# Patient Record
(demographics unavailable — no encounter records)

---

## 2017-06-14 NOTE — PD
HPI


Chief Complaint:  STEMI Alert


Time Seen by Provider:  21:49


Travel History


International Travel<30 days:  No


Contact w/Intl Traveler<30days:  No


Traveled to known affect area:  No





History of Present Illness


HPI


The patient 70 years old and complains of retrosternal chest pain 7/10 which 

started shortly after he ate a roast beef dinner, about 90 minutes prior to the 

time of evaluation.  The pain is constant.  He denies radiation of the pain 

however noted that this morning he had some pain in the region of the right 

flank which seemed to radiate upward towards the right chest.  That pain went 

away as the patient worked today at the [employer].  He reports a family 

history of coronary artery disease with his father suffering an MI.  He has had 

no cough or fever or diaphoresis.  He does not smoke.  He reports intermittent 

pains involving the right flank/right lateral thorax/abdomen for a month or so.

  He thinks it could be related to his kidney.  He denies a personal history of 

coronary artery disease.  He notes a prolonged period of generalized inactivity 

for him at least weeks in duration.  At 21:48 and EKG was obtained revealing an 

inferior wall acute myocardial infarction.  Cardiologist was immediately 

notified and the patient the cath lab .  Heparin, aspirin and metoprolol 

ordered.  Nitro drip deferred secondary to elevations in leads II, III, and aVF.





PFSH


Past Medical History


Autoimmune Disease:  No


Blood Disorders:  No


Anxiety:  Yes


Depression:  Yes (X 10 YEARS)


Cancer:  No


Cardiovascular Problems:  Yes


High Cholesterol:  Yes


Chemotherapy:  No


Diabetes:  Yes


Patient Takes Glucophage:  No


Diminished Hearing:  No


Endocrine:  No


Gastrointestinal Disorders:  Yes (POSSIBLE HERNIAS)


Genitourinary:  Yes (SLOW STREAM FOR YEARS)


Hypertension:  Yes


Immune Disorder:  No


Kidney Stones:  Yes (X1 1996)


Musculoskeletal:  No


Neurologic:  No


Psychiatric:  Yes


Reproductive:  No


Radiation Therapy:  No


Renal Failure:  No


Sickle Cell Disease:  No





Past Surgical History


Abdominal Surgery:  Yes (LT INGUINAL REPAIR 1983)


AICD:  No


Arteriovenous Shunt:  No


Cardiac Surgery:  No


Ear Surgery:  No


Endocrine Surgery:  No


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


Genitourinary Surgery:  No


Gynecologic Surgery:  No


Insulin Pump:  No


Joint Replacement:  No


Oral Surgery:  No


Pacemaker:  No


Thoracic Surgery:  No


Other Surgery:  Yes (HERNIA REPAIR)





Social History


Alcohol Use:  Yes (SOCIAL)


Tobacco Use:  No


Substance Use:  No





Allergies-Medications


(Allergen,Severity, Reaction):  


Coded Allergies:  


     Terazosin (Verified  Allergy, Severe, Hallucinations, 6/14/17)


Reported Meds & Prescriptions





Reported Meds & Active Scripts


Active


Catapres (Clonidine HCl) 0.2 Mg Tab 0.2 Mg PO DAILY 


Reported


Zocor (Simvastatin) 40 Mg Tab 40 Mg PO HS 


Zoloft (Sertraline HCl) 100 Mg Tab 100 Mg PO HS 


Ativan (Lorazepam) 1 Mg Tab 0.5-1 Tab PO Q8HPRN 


Glucophage (Metformin HCl) 500 Mg Tab 500 Mg PO BID 


Hctz (Hydrochlorothiazide) 25 Mg Tab 25 Mg PO DAILY 


Vasotec (Enalapril Maleate) 10 Mg Tab 10 Mg PO BID 


Toprol Xl (Metoprolol Succinate) 100 Mg Tab 100 Mg PO DAILY 








Review of Systems


Except as stated in HPI:  all other systems reviewed are Neg


General / Constitutional:  No: Fever, Chills


Cardiovascular:  Positive: Chest Pain or Discomfort





Physical Exam


Narrative


GENERAL: 70-year-old male well-nourished well-developed moderate distress due 

to anxiety and/or pain


SKIN: Focused skin assessment warm/dry.


HEAD: Atraumatic. Normocephalic. 


EYES: Pupils equal and round. No scleral icterus. No injection or drainage. 


ENT: No nasal bleeding or discharge.  Mucous membranes pink and moist.


NECK: Trachea midline. No JVD. 


CARDIOVASCULAR: Heart rate is approximately 100 110.  It is regular.


RESPIRATORY: Lungs are clear.  Tachypnea present.


GASTROINTESTINAL: Abdomen soft, non-tender, nondistended. Hepatic and splenic 

margins not palpable. 


MUSCULOSKELETAL: No obvious deformities. No clubbing.  No cyanosis.  No edema. 


NEUROLOGICAL: Awake and alert. No obvious cranial nerve deficits.  Motor 

grossly within normal limits. Normal speech.


PSYCHIATRIC: Appropriate mood and affect; insight and judgment normal.





Data


Data


Last Documented VS





Vital Signs








  Date Time  Temp Pulse Resp B/P Pulse Ox O2 Delivery O2 Flow Rate FiO2


 


6/14/17 21:55     100 Nasal Cannula 2 


 


6/14/17 20:40 97.5 98 22 142/81    





Vital signs reviewed


Orders





 Troponin I (6/14/17 21:51)


Ckmb (Isoenzyme) Profile (6/14/17 21:51)


Complete Blood Count With Diff (6/14/17 21:51)


I-Stat Profile (6/14/17 21:51)


I-Stat Creatinine (6/14/17 21:51)


Calcium (6/14/17 21:51)


Magnesium (Mg) (6/14/17 21:51)


Prothrombin Time / Inr (Pt) (6/14/17 21:51)


Act Partial Throm Time (Ptt) (6/14/17 21:51)


B-Type Natriuretic Peptide (6/14/17 21:51)


Chest, Single Ap (6/14/17 21:51)


Electrocardiogram (6/14/17 21:51)


Oxygen Administration (6/14/17 21:51)


Iv Access Insert/Monitor (6/14/17 21:51)


Oximetry (6/14/17 21:51)


Sodium Chlor 0.9% 1000 Ml Inj (Ns 1000 M (6/14/17 21:51)


Sodium Chloride 0.9% Flush (Ns Flush) (6/14/17 22:00)


Aspirin Chew (Aspirin Chew) (6/14/17 21:51)


Heparin Inj (Heparin Inj) (6/14/17 21:51)


Metoprolol Tartrate Inj (Lopressor Inj) (6/14/17 22:00)


Admit Order (Ed Use Only) (6/14/17 21:53)





Labs








 Laboratory Tests








Test 6/14/17





 21:50


 


Bedside Hemoglobin 14.6 G/DL


 


Bedside Hematocrit 43.0 %


 


Prothrombin Time 10.2 SEC


 


Prothromb Time International 0.9 RATIO





Ratio 


 


Activated Partial 24.3 SEC





Thromboplast Time 


 


Bedside Sodium 141 MMOL/L


 


Bedside Potassium 3.5 MMOL/L


 


Bedside Chloride 105 MMOL/L


 


Bedside Blood Urea Nitrogen 27 MG/DL


 


Bedside Creatinine 1.7 MG/DL


 


Bedside Glucose 349 MG/DL


 


Calcium Level 9.4 MG/DL














[facility]


Medical Decision Making


Medical Screen Exam Complete:  Yes


Emergency Medical Condition:  Yes


Differential Diagnosis


STEMI, unstable angina, coronary vasospasm, PE, PTX, aortic dissection, 

pericarditis, myocarditis, endocarditis, PNA, esophageal disease, aneurysm, 

musculoskeletal etiologies, anxiety, cocaine/sympathomimetic abuse


Narrative Course


EKG reveals ST elevations in the inferior leads with reciprocal changes in I 

and aVL well as V2, V3 and V4.


The patient will go directly to the catheterization lab for coronary 

intervention.  Case discussed with Dr. Thomas.


 iSTATs


Creatinine 1.7


BUN 27


Hb 14.6


Glucose 349


I accompanied pt to cath lab where he remained stable.


Dr William arrived at 1030pm.


Critical Care Narrative


Aggregate critical care time was 50 minutes. Time to perform other separately 

billable procedures was not  


included in the critical care time. My time did not include minutes spent 

treating any other patients simultaneously or on  


activities that did not directly contribute to the patient's treatment.  





The services I provided to this patient were to treat and/or prevent clinically 

significant deterioration that could result  


in: Cardiopulmonary arrest, permanent disability, mortality





I provided critical care services requiring my management, as noted below:


Chart data review, documentation time, medication orders and management, vital 

sign assessments/reviewing monitor data,  


ordering and reviewing lab tests, ordering and interpreting/reviewing x-rays 

and diagnostic studies, care of the patient  


and discussion of the patient with the admitting physicians.





Diagnosis





 Primary Impression:  


 STEMI (ST elevation myocardial infarction)


 Qualified Code:  I21.19 - ST elevation myocardial infarction (STEMI) involving 

other coronary artery of inferior wall





Admitting Information


Admitting Physician Requests:  Jeffry Anand MD Jun 14, 2017 22:03

## 2017-06-14 NOTE — RADRPT
EXAM DATE/TIME:  06/14/2017 22:01 

 

[facility] COMPARISON:     

No previous studies available for comparison.

 

                     

INDICATIONS :     

Chest pain, STEMI alert.

                     

 

MEDICAL HISTORY :     

None.          

 

SURGICAL HISTORY :     

None.   

 

ENCOUNTER:     

Initial                                        

 

ACUITY:     

1 day      

 

PAIN SCORE:     

10/10

 

LOCATION:     

Bilateral chest 

 

FINDINGS:     

Trace bibasilar atelectasis. No infiltrate. No pleural effusion or pneumothorax.

 

Heart size upper limits of normal. Thoracic aorta is atherosclerotic and tortuous.

 

CONCLUSION:     

Minimal bibasilar atelectasis. Borderline cardiomegaly. Tortuous and atherosclerotic aorta.

 

 

 

 Michelet Larios MD on June 14, 2017 at 22:05           

Board Certified Radiologist.

 This report was verified electronically.

## 2017-06-15 NOTE — RADRPT
EXAM DATE/TIME:  06/15/2017 01:13 

 

[facility] COMPARISON:     

CHEST SINGLE AP, June 14, 2017, 22:01.

 

                     

INDICATIONS :     

Difficulty breathing, chest congestion

                     

 

MEDICAL HISTORY :     

None.          

 

SURGICAL HISTORY :     

None.   

 

ENCOUNTER:     

Initial                                        

 

ACUITY:     

1 day      

 

PAIN SCORE:     

Non-responsive.

 

LOCATION:     

Bilateral chest 

 

FINDINGS:     

There is moderate perivascular pulmonary edema. Focal consolidation is not seen.

 

CONCLUSION:     Moderate CHF not present previously.

 

 

 PJ Randolph MD on Lisbeth 15, 2017 at 1:24           

Board Certified Radiologist.

 This report was verified electronically.

## 2017-06-15 NOTE — PD.CONS
[facility]


Service


Critical Care Medicine


Consult Requested By





Primary Care Physician


Katlin Morrow


History of Present Illness


70 years old and complains of retrosternal chest pain 7/10 which started 

shortly after he ate a roast beef dinner, about 90 minutes prior to the time of 

evaluation.  The pain was constant.  He denied radiation of the pain however 

noted that this morning he had some pain in the region of the right flank which 

seemed to radiate upward towards the right chest.  That pain went away as the 

patient worked today at the [employer].  He reports a family history of 

coronary artery disease with his father suffering an MI.  He has no prior 

history of coronary artery disease.  At 21:48 and EKG was obtained revealing an 

inferior wall acute myocardial infarction.  Cardiologist was notified and the 

patient went immediately to cardiac catheterization for successful percutaneous 

intervention.





Review of Systems


Constitutional:  COMPLAINS OF: Diaphoretic episodes,  DENIES: Fatigue, Fever, 

Weight gain, Weight loss, Chills, Dizziness, Change in appetite, Night Sweats


Endocrine:  DENIES: Heat/cold intolerance, Polydipsia, Polyuria, Polyphagia


Eyes:  DENIES: Blurred vision, Diplopia, Eye inflammation, Eye pain, Vision loss

, Photosensitivity, Double Vision


Ears, nose, mouth, throat:  DENIES: Tinnitus, Hearing loss, Vertigo, Nasal 

discharge, Oral lesions, Throat pain, Hoarseness, Ear Pain, Running Nose, 

Epistaxis, Sinus Pain, Toothache, Odynophagia


Respiratory:  COMPLAINS OF: Shortness of breath,  DENIES: Apneas, Cough, Snoring

, Wheezing, Hemoptysis, Sputum production


Cardiovascular:  COMPLAINS OF: Chest pain, Dyspnea on Exertion,  DENIES: 

Palpitations, Syncope, PND, Lower Extremity Edema, Orthopnea, Claudication


Gastrointestinal:  DENIES: Abdominal pain, Black stools, Bloody stools, 

Constipation, Diarrhea, Nausea, Vomiting, Difficulty Swallowing, Anorexia


Genitourinary:  DENIES: Sexual dysfunction, Urinary frequency, Urinary 

incontinence, Urgency, Hematuria, Dysuria, Nocturia, Penile Discharge, 

Testicular Pain, Testicular Swelling


Musculoskeletal:  DENIES: Joint pain, Muscle aches, Stiffness, Joint Swelling, 

Back pain, Neck pain


Integumentary:  DENIES: Abnormal pigmentation, Nail changes, Pruritus, Rash


Hematologic/lymphatic:  DENIES: Bruising, Lymphadenopathy


Immunologic/allergic:  DENIES: Eczema, Urticaria


Neurologic:  DENIES: Abnormal gait, Headache, Localized weakness, Paresthesias, 

Seizures, Speech Problems, Tremor, Poor Balance


Psychiatric:  DENIES: Anxiety, Confusion, Mood changes, Depression, 

Hallucinations, Agitation, Suicidal Ideation, Homicidal Ideation, Delusions





Past Family Social History


Allergies:  


Coded Allergies:  


     Terazosin (Verified  Allergy, Severe, Hallucinations, 6/14/17)


Past Medical History


Hypertension


Diabetes


BPH


Dyslipidemia


Depression


Anxiety


Past Surgical History


Left inguinal hernia repair in 1983


Right eye glaucoma surgery 2012


Reported Medications





Reported Meds & Active Scripts


Active


Catapres (Clonidine HCl) 0.2 Mg Tab 0.2 Mg PO DAILY 


Reported


Zocor (Simvastatin) 40 Mg Tab 40 Mg PO HS 


Zoloft (Sertraline HCl) 100 Mg Tab 100 Mg PO HS 


Ativan (Lorazepam) 1 Mg Tab 0.5-1 Tab PO Q8HPRN 


Glucophage (Metformin HCl) 500 Mg Tab 500 Mg PO BID 


Hctz (Hydrochlorothiazide) 25 Mg Tab 25 Mg PO DAILY 


Vasotec (Enalapril Maleate) 10 Mg Tab 10 Mg PO BID 


Toprol Xl (Metoprolol Succinate) 100 Mg Tab 100 Mg PO DAILY


Active Ordered Medications





Current Medications








 Medications


  (Trade)  Dose


 Ordered  Sig/Era


 Route


 PRN Reason  Start Time


 Stop Time Status Last Admin


Dose Admin


 


 Sodium Chloride


  (NS Flush)  2 ml  UNSCH  PRN


 IVF


 FLUSH AFTER USING IV ACCESS  6/14/17 22:00


    6/14/17 22:05


 


 


 Lidocaine HCl


  (Xylocaine 2%


 Jelly)  1 applic  UNSCH  PRN


 TOP


 CATHETER INSERTION  6/15/17 00:15


     


 


 


 Aspirin


  (Aspirin Chew)  81 mg  DAILY


 PO


   6/15/17 09:00


     


 


 


 Prasugrel


  (Effient)  10 mg  DAILY


 PO


   6/15/17 09:00


     


 


 


 Carvedilol


  (Coreg)  3.125 mg  BID


 PO


   6/15/17 09:00


     


 


 


 Lisinopril


  (Prinivil)  2.5 mg  DAILY


 PO


   6/15/17 09:00


     


 


 


 Atorvastatin


 Calcium 80 mg  80 mg  HS


 PO


   6/15/17 00:30


     


 


 


 Heparin Sodium/


 Dextrose  250 ml @ 0


 mls/hr  TITRATE


 IV


   6/15/17 00:30


     


 


 


 Tirofiban/Sodium


 Chloride


  (Aggrastat


 Infusion Inj)  250 ml @ 


 16.83 mls/


 hr  [id number]


 IV


   6/15/17 00:18


     


 








Family History


Father's history positive for coronary artery disease and myocardial infarction


Social History


Negative for smoking


Negative for illicit drug abuse


Drinks 2 beers per week





Physical Exam


Vital Signs





 Vital Signs








  Date Time  Temp Pulse Resp B/P Pulse Ox O2 Delivery O2 Flow Rate FiO2


 


6/14/17 22:06  110 22 142/83 100  2 


 


6/14/17 21:55     100 Nasal Cannula 2 


 


6/14/17 21:51     100  2.00 


 


6/14/17 20:40 97.5 98 22 142/81 99 Room Air  








Physical Exam


GENERAL: Well-nourished, well-developed patient.


SKIN: Warm and dry.


HEAD: Normocephalic.


EYES: No scleral icterus. No injection or drainage. 


NECK: Supple, trachea midline. No JVD or lymphadenopathy.


CARDIOVASCULAR: Regular rate and rhythm without murmurs, gallops, or rubs. 


RESPIRATORY: Breath sounds equal bilaterally. No accessory muscle use.


GASTROINTESTINAL: Abdomen soft, non-tender, nondistended. 


MUSCULOSKELETAL: No cyanosis, or edema. 


BACK: Nontender without obvious deformity. No CVA tenderness.


EXTREMITIES: No clubbing cyanosis or edema


Laboratory





Laboratory Tests








Test 6/14/17





 21:50


 


Bedside Hemoglobin 14.6 


 


Bedside Hematocrit 43.0 


 


Prothrombin Time 10.2 


 


Prothromb Time International 0.9 





Ratio 


 


Activated Partial 24.3 





Thromboplast Time 


 


Bedside Sodium 141 


 


Bedside Potassium 3.5 


 


Bedside Chloride 105 


 


Bedside Blood Urea Nitrogen 27 


 


Bedside Creatinine 1.7 


 


Bedside Glucose 349 


 


Calcium Level 9.4 


 


Magnesium Level 2.2 


 


Total Creatine Kinase 393 


 


Creatine Kinase MB 30.1 


 


Creatine Kinase MB % 7.7 


 


Troponin I 3.12 


 


B-Type Natriuretic Peptide 167 








Imaging





Last 24 hours Impressions








Chest X-Ray 6/14/17 2151 Signed





Impressions: 





 Service Date/Time:  Wednesday, June 14, 2017 22:01 - CONCLUSION:  Minimal 





 bibasilar atelectasis. Borderline cardiomegaly. Tortuous and atherosclerotic 





 aorta.     Michelet Larios MD 











Assessment and Plan


Assessment and Plan


STEMI


- Emergent cardiac catheterization with successful percutaneous intervention


- Effient


- Aggrastat Infusion


- Heparin drip


- Aspirin





Hypertension


- Carvedilol


- Lisinopril





Dyslipidemia


- Atorvastatin





Diabetes mellitus


- Hold by mouth antidiabetic medications while in the ICU


- Insulin sliding scale





DVT GI prophylaxis


- Heparin drip


- Teds SCDs


- ADA diet





Critical Care:


The total critical care time was 35 minutes. Time to perform other separately 

billable procedures was not included in the critical care time.








Sridhar Maciel MD Isaías 15, 2017 01:02

## 2017-06-15 NOTE — PD.CARD.PN
Subjective


Subjective Remarks


No CP or SOB, sheaths removed without difficulty, mild hypotension





Objective


Medications





 Current Medications








 Medications


  (Trade)  Dose


 Ordered  Sig/Era


 Route  Start Time


 Stop Time Status Last Admin


 


  (NS Flush)  2 ml  UNSCH  PRN


 IVF  6/14/17 22:00


    6/14/17 22:05


 


 


  (Xylocaine 2%


 Jelly)  1 applic  UNSCH  PRN


 TOP  6/15/17 00:15


     


 


 


  (Aspirin Chew)  81 mg  DAILY


 PO  6/15/17 09:00


    6/15/17 08:28


 


 


  (Effient)  10 mg  DAILY


 PO  6/15/17 09:00


    6/15/17 08:28


 


 


  (Coreg)  3.125 mg  BID


 PO  6/15/17 09:00


    6/15/17 08:27


 


 


  (Prinivil)  2.5 mg  DAILY


 PO  6/15/17 09:00


   Hold 6/15/17 08:26


 


 


  (Lipitor)  80 mg  HS


 PO  6/15/17 00:30


     


 


 


  (Zofran Inj)  4 mg  Q6H  PRN


 IV PUSH  6/15/17 01:15


     


 


 


  (D50w (Vial) Inj)  50 ml  UNSCH  PRN


 IV  6/15/17 01:15


     


 


 


  (Glucagon Inj)  1 mg  UNSCH  PRN


 OTHER  6/15/17 01:15


     


 


 


  (Imdur)  30 mg  DAILY


 PO  6/15/17 10:30


    6/15/17 10:35


 


 


  (Pill Splitter)  1 ea  UNSCH  PRN


 OTHER  6/15/17 10:30


     


 


 


  (Prinivil)  2.5 mg  DAILY


 PO  6/16/17 09:00


     


 








Vital Signs / I&O





 Vital Signs








  Date Time  Temp Pulse Resp B/P Pulse Ox O2 Delivery O2 Flow Rate FiO2


 


6/15/17 16:00 98.5 78 19 91/59 99   


 


6/15/17 16:00  76      


 


6/15/17 14:00  80      


 


6/15/17 12:00  98      


 


6/15/17 12:00 98.3 98 17 114/76 95   


 


6/15/17 10:00  95      


 


6/15/17 09:04     94 Simple Mask 9.00 


 


6/15/17 08:00 99.0 100 20 123/81 95   


 


6/15/17 08:00  113      


 


6/15/17 07:00     95 Simple Mask 5.00 


 


6/15/17 06:00  108      


 


6/15/17 04:00 99.2 111 22 137/87 94   


 


6/15/17 04:00  108      


 


6/15/17 03:02     96  15.00 100


 


6/15/17 01:00  126      


 


6/15/17 01:00 98.6 126 33 150/90 80   


 


6/14/17 22:06  110 22 142/83 100  2 


 


6/14/17 21:55     100 Nasal Cannula 2 


 


6/14/17 21:51     100  2.00 


 


6/14/17 21:51     100 Nasal Cannula 2.00 


 


6/14/17 20:40 97.5 98 22 142/81 99 Room Air  








 I/O








 6/14/17 6/14/17 6/14/17 6/15/17 6/15/17 6/15/17





 07:00 15:00 23:00 07:00 15:00 23:00


 


Intake Total    156 ml 1050 ml 


 


Output Total    1500 ml 1450 ml 


 


Balance    -1344 ml -400 ml 


 


      


 


Intake Oral     500 ml 


 


IV Total    156 ml 550 ml 


 


Output Urine Total    1500 ml 1450 ml 


 


# Bowel Movements     0 








Physical Exam


GENERAL: In NAD


SKIN: Warm and dry.


HEAD: Normocephalic.


EYES: No scleral icterus. No injection or drainage. 


NECK: Supple, trachea midline. No JVD or lymphadenopathy.


CARDIOVASCULAR: Regular rate and rhythm without murmurs, gallops, or rubs. 


RESPIRATORY: Breath sounds equal bilaterally. No accessory muscle use.


GASTROINTESTINAL: Abdomen soft, non-tender, nondistended. 


MUSCULOSKELETAL: No cyanosis, or edema. 


Groin stable, no hematoma





Laboratory





Laboratory Tests








Test 6/14/17 6/15/17 6/15/17 6/15/17





 21:50 02:31 08:30 11:51


 


Bedside Hemoglobin 14.6 G/DL   


 


Bedside Hematocrit 43.0 %   


 


Prothrombin Time 10.2 SEC 10.8 SEC  


 


Prothromb Time International 0.9 RATIO 1.0 RATIO  





Ratio    


 


Activated Partial 24.3 SEC 68.1 SEC 48.0 SEC 29.4 SEC





Thromboplast Time    


 


Bedside Sodium 141 MMOL/L   


 


Bedside Potassium 3.5 MMOL/L   


 


Bedside Chloride 105 MMOL/L   


 


Bedside Blood Urea Nitrogen 27 MG/DL   


 


Bedside Creatinine 1.7 MG/DL   


 


Bedside Glucose 349 MG/DL   


 


Calcium Level 9.4 MG/DL 8.6 MG/DL  


 


Magnesium Level 2.2 MG/DL 2.0 MG/DL  


 


Total Creatine Kinase 393 U/L   


 


Creatine Kinase MB 30.1 NG/ML   


 


Creatine Kinase MB % 7.7 %   


 


Troponin I 3.12 NG/ML   


 


B-Type Natriuretic Peptide 167 PG/ML   


 


White Blood Count  18.0 TH/MM3  


 


Red Blood Count  5.53 MIL/MM3  


 


Hemoglobin  16.3 GM/DL  


 


Hematocrit  50.2 %  


 


Mean Corpuscular Volume  90.7 FL  


 


Mean Corpuscular Hemoglobin  29.5 PG  


 


Mean Corpuscular Hemoglobin  32.5 %  





Concent    


 


Red Cell Distribution Width  13.4 %  


 


Platelet Count  216 TH/MM3  


 


Mean Platelet Volume  9.0 FL  


 


Neutrophils (%) (Auto)  92.8 %  


 


Lymphocytes (%) (Auto)  2.8 %  


 


Monocytes (%) (Auto)  4.2 %  


 


Eosinophils (%) (Auto)  0.0 %  


 


Basophils (%) (Auto)  0.2 %  


 


Neutrophils # (Auto)  16.7 TH/MM3  


 


Lymphocytes # (Auto)  0.5 TH/MM3  


 


Monocytes # (Auto)  0.8 TH/MM3  


 


Eosinophils # (Auto)  0.0 TH/MM3  


 


Basophils # (Auto)  0.0 TH/MM3  


 


CBC Comment  DIFF FINAL   


 


Differential Comment     


 


Sodium Level  140 MEQ/L  


 


Potassium Level  4.3 MEQ/L  


 


Chloride Level  106 MEQ/L  


 


Carbon Dioxide Level  22.7 MEQ/L  


 


Anion Gap  11 MEQ/L  


 


Blood Urea Nitrogen  28 MG/DL  


 


Creatinine  1.81 MG/DL  


 


Estimat Glomerular Filtration  37 ML/MIN  





Rate    


 


Random Glucose  346 MG/DL  


 


Phosphorus Level  3.2 MG/DL  


 


Total Bilirubin  0.7 MG/DL  


 


Aspartate Amino Transf  675 U/L  





(AST/SGOT)    


 


Alanine Aminotransferase  95 U/L  





(ALT/SGPT)    


 


Alkaline Phosphatase  114 U/L  


 


Total Protein  7.0 GM/DL  


 


Albumin  3.6 GM/DL  








Imaging





Last Impressions








Chest X-Ray 6/15/17 0000 Signed





Impressions: 





 Service Date/Time:  Thursday, Lisbeth 15, 2017 01:13 - CONCLUSION: Moderate CHF 

not 





 present previously.    PJ Randolph MD 











Assessment and Plan


Problem List:  


(1) STEMI (ST elevation myocardial infarction)


(2) CAD (coronary artery disease)


(3) Systolic CHF


(4) Ischemic cardiomyopathy


(5) DM (diabetes mellitus)


(6) HTN (hypertension)


(7) Hyperlipidemia


(8) Renal insufficiency


Assessment and Plan


Continue ICU monitoring. Continue post MI care. Groin stable. Continue platelet 

inhibition with Effient and baby ASA. Continue low dose beta blocker and ACE 

inhibitor. Agree w gentle diuresis. Monitor renal fx. Gradually increase 

activity.





Problem Qualifiers





(1) STEMI (ST elevation myocardial infarction):  


Qualified Code:  I21.19 - ST elevation myocardial infarction (STEMI) involving 

other coronary artery of inferior wall





Sara Thomas MD Isaías 15, 2017 19:47

## 2017-06-15 NOTE — MB
cc:

ANITA ROOT

****

 

 

DATE OF CONSULTATION

6/14/2017

 

REASON FOR CONSULTATION

Mr. Bello is a 70-year-old white male with no previous cardiac history who

developed a 7/10 chest pain after he ate dinner.  His pain was substernal and

associated with shortness of breath.  He has had recent pains in the right

flank and right lateral thorax and abdomen for the last month.  He

presented to the emergency room and EKG showed acute inferior

wall myocardial infarction.  He is brought to the cardiac

catheterization laboratory for further management.

 

PAST MEDICAL HISTORY

Positive for:

1.   Hypertension

2.   Diabetes mellitus

3.   Nephrolithiasis

4.   Depression

5.   Dyslipidemia

6.   Hernia

7.   BPH

8.   History of left inguinal repair.

9.   Surgery for right eye glaucoma.

 

MEDICATIONS

Medications include:

1. Toprol XL

2. Vasotec

3. Hydrochlorothiazide

4. Glucophage

5. Ativan

6. Zoloft

7. Zocor

 

ALLERGIES

TERAZOSIN

 

SOCIAL HISTORY

The patient does not smoke.  He drinks alcohol moderately.  He lives alone.

 

FAMILY HISTORY

Positive for heart disease in his father.

 

REVIEW OF SYSTEMS

Otherwise negative.

 

PHYSICAL EXAMINATION

Blood pressure 142/83, pulse 110 and regular.

HEENT:  Negative.

NECK:  2+ carotid upstrokes. No bruits.

LUNGS:  Clear.

HEART:  Regular with no murmur, gallop or rub.

ABDOMEN:  Soft.  No bruits.

EXTREMITIES:  Without edema.  1-2+ distal pulses.

NEUROLOGIC:  Exam is nonfocal.

 

EKG was reviewed and showed mild sinus tachycardia, first degree AV block and

inferior ST elevation with lateral reciprocal changes consistent with acute

inferior wall myocardial infarction.

 

LABORATORY DATA

Hemoglobin 14.6, potassium 285, creatinine 1.7, , troponin 3.1.  BNP

157.

 

DIAGNOSIS

1.   Acute inferior wall myocardial infarction (STEMI)

2.   Diabetes mellitus

3.   Hypertension

4.   Dyslipidemia

 

DISPOSITION

Mr. Bello presented with acute IWMI. He will undergo emergent cardiac

catheterization and coronary intervention. He understands the risks

and benefits, and wishes to proceed.  We will continue aggressive

modification of his cardiac risk factors.

 

 

 

                              _________________________________

                              MD KATIE Solano/PILO

D:  6/14/2017/11:59 PM

T:  6/15/2017/10:04 AM

Visit #:  [id number]

Job #:  [id number]

ESTELA

## 2017-06-15 NOTE — CATHPROC
Lookback HIS Report

Study Information

Study Number    Admission           Scheduled Start              Study Start

 

[id number]    Jun 14 2017 9:55PM      06/14/2017 Jun 14 2017 10:23PM

 

Universal Service

 

Cardiac Catheterization

 

Admit Source               Facility Department

 

Emergency department           [facility] - Cath Lab

 

Physician and Clinical Staff

Initial Sara Hernandez          Circulator     Oleksandr Alcala,RN

 

                         Circulator     Katherine Mills RN

 

                         Recorder      Holly Medina RT(R) (BS)

 

                         Scrub        Kings Joiner RCIS(BS)

 

Procedures Performed

Procedure                     Location (Site)             Vessel Name

 

Angiogram LV                   LV                   Ventricle

Coronary Angiograms                 LCA                  Left Coronary

Coronary Angiograms                 RCA                  Right Coronary

Drug Eluting Inflatio               RCA Mid                 Right Coronary

L Heart Cath

Pacemaker Temp                   Fem Vein (right)            Femoral Vein

PTCA                       RCA Mid                 Right Coronary

Wire insertion                   Fem Art (right)            Femoral Art

Wire insertion                  LAD Mid                 Left Coronary

Equipment

Time         Description           Size      Mfg Part Number  Used/Scraped

                 WIRE, WHISPER W/HYDROCOAT           [id number]

23:23   [employer]                  190CM              Used

                 190CM                     *[id number]

                                        [id number]

23:10   [employer]S     PACING CATHETER J CURVE     FR 5               Used

                                        *[id number]

                 TRANSDUCER, TRUWAVE              [id number]

22:36   GARBER MILLS                     *                Used

                 W/STOCKCOCK                  *[id number]

                                        [phone]



                                        *[id number]

                                        534-548T



                                        *[id number]

                                        534-620T



                                        *[id number]

                                        534-552S



                                        *[id number]

                                        595-



                                        *[id number]

                                        [phone]



                                        *[id number]

                                        [id number]

22:36   [employer]   PACK, CCL CUSTOM        *                Used

                                        *[id number]

                                        [id number]

22:36   [employer]ER     PEN, SKIN DUAL W/ RULER     *                Used

                                        *[id number]

                                        [id number]

22:53   [employer]       BALLOON, 2.0 X 12MM EUPHORA 12MM                 Used

                                        *[id number]

                                        [id number]

22:46   [employer]       CATHETER, EXPORT ASPIRATON                    Used

                                        *[id number]

                 STENT, 3.5 38 RESOLUTE             [id number]

22:58   [employer]                       3.5 38              Used

                 INTEGRITY RX                  *[id number]

                 STENT, 4.0 26 RESOLUTE             [id number]

23:04   [employer]                       4.0 26              Used

                 INTEGRITY RX                  *[id number]

                                        [id number]

22:55   [employer]     30 ARCADIO INDEFLATOR                        Used

                                        *[id number]

                                        [id number]

23:08   [employer]     SHEATH, FR6.5 PRELUDE 11CM   FR 6.5     038ACT      Used

                                        *[id number]

                                        [id number]

22:36   [employer]     SHEATH, FR6.5 PRELUDE 11CM   FR 6.5     038ACT      Used

                                        *[id number]

                                        [id number]

22:36   [employer]     WIRE, 3MMJ .035 180CM      180CM              Used

                                        *[id number]

                                        [id number]

22:36   [employer]         MANIFOLD, 4 PORT        *                Used

                                        *[id number]

                                        [id number]

22:36   [employer]         TUBING, HIGH PRESSURE 48"    48"               Used

                                        *[id number]

22:36   [employer]        OMNIPAQUE, 350 MG, 150ML    150ML     [id number]      Used

                                        [id number]

22:36   [employer]     BLANKET,WARM AIR CCL      *                Used

                                        *[id number]

                 WIRE, RUNTHROUGH NS FLOPPY           

23:14   [employer]                     180CM              Used

                 .014 180CM                   *[id number]

 

Equipment Model, Serial, Lot Number and Expiration Data

Description           Model Number      Serial Number  Lot Number       Expiration Date

 

CATHETER, EXPORT ASPIRATON    [id number]               [id number]       01-

 

SHEATH, FR6.5 PRELUDE 11CM                        [id number]        03-

STENT, 4.0 26 RESOLUTE

                 [id number]               [id number]       08-

INTEGRITY RX

History: Current Medications

Medication           Dosage/Unit        Route         Frequency      Last Date/Time Taken

 

HEPARIN

 

ASA

 

 

History: Allergies

Allergy                Reaction

 

Terazosin               Hallucinations

 

 

History: Risk Factors

                       Family History of

Hypertension     Dyslipidemia                    Previous MI     Previous Heart Failure

                       Premature CAD

Yes         Yes           Yes            No         No

Prior Valve

           Prior PCI        Prior CABG

Surgery

No          No            No

           Cerebrovascular     Peripheral Artery     Chronic Lung

On Dialysis                                         Diabetes   Diabetes Therapy

           Disease         Disease          Disease

No          No            No            No         Yes      Oral

 

 

History: Symptoms/Diagnosis Selection Items

Chest pain

 

 

History: Stress Tests

Stress or Imaging Studies Performed

 

No

 

 

History: Other

Current Smoker

 

No

 

Labs

Hgb (g/dl)

 

12.00-18.00

 

14.6

 

Glucose (mg/dl)      BUN (mg/dl)       Creatinine (mg/dl)    BUN:Creatinine (1:x)

60..00       8.00-20.00        0.10-9.00         10.00-20.00

 

349            27            1.7            15.9

 

Na (meq/l)        K (meq/l)        Cl (meq/l)        Ca (mg/dl)

 

138..00       3.80-5.10        101..00       9.00-10.50

 

141            3.5           105            9.4

 

PT (sec)         INR (PTT:PT)

 

9.40-11.40        0.50-2.00

 

10.2           0.9

 

Troponin I (ng/ml)    CPK-MB (ng/ML)

 

0.40-2.30         0.00-7.00

 

3.12           7.7

Medication

Medication Total Dose (Bolus/Oral)

Medication              Total Dosage/Unit

 

1% XYLOCAINE                20 mL

 

AGGRASTAT BOLUS              48.6 mL

 

EFFIENT                   60 mg

 

FENTANYL                  100 mcg

 

HEPARIN                 7000 units

 

VERSED                    4 mg

 

Medications (Bolus/Oral)

Medication          Time Given          Dosage/Unit       Administered By   Reason

 

VERSED            6/14/2017 10:30:14 PM      2 mg         Oleksandr Alcala

2 mg VERSED given in lab by Oleksandr Alcala RN in Left Forearm via Peripheral IV.

 

FENTANYL           6/14/2017 10:31:25 PM      50 mcg        Oleksandr Alcala

50 mcg FENTANYL given in lab by Oleksandr Alcala RN in Left Forearm via Peripheral IV.

 

1% XYLOCAINE         6/14/2017 10:36:34 PM      20 mL         Sara Thomas

20 mL 1% XYLOCAINE given in lab by Sara Thomas in Right Groin via Subcutaneous.

 

VERSED            6/14/2017 10:38:17 PM      2 mg         Oleksandr Alcala

2 mg VERSED given in lab by Oleksandr Alcala RN in Left Forearm via Peripheral IV.

 

HEPARIN            6/14/2017 10:43:53 PM    7000 units        Oleksandr Alcala

7000 units HEPARIN given in lab by Oleksandr Alcala RN in Left Forearm via Peripheral IV.

 

AGGRASTAT BOLUS        6/14/2017 10:55:48 PM     48.6 mL         Katherine Mills

48.6 mL AGGRASTAT BOLUS given in lab by Katherine Mills RN in Left Forearm via Peripheral IV.

 

FENTANYL           6/14/2017 11:16:23 PM      50 mcg        Oleksandr Alcala

50 mcg FENTANYL given in lab by Oleksandr Alcala RN in Left Forearm via Peripheral IV.

 

EFFIENT            6/15/2017 12:00:45 AM      60 mg         Katherine Mills

60 mg EFFIENT given in lab by Katherine Mills RN via Oral.

Medication (Drip)

Medication           Time Given          Dosage/Unit      Concentration/Unit  Diluent (ml)  Solution

 

AGGRASTAT DRIP         6/14/2017 10:56:46 PM    0.076 mcg/kg/min       12.5 mg     250      NaCl .9

0.076 mcg/kg/min AGGRASTAT DRIP given in lab by Katherine Mills RN in Left Forearm via Peripheral 
IV. Pump/Drip Flow = 8.5 ml/hr using

NaCl .9 with a concentration of 12.5 mg in 250 ml.

HEPARIN DRIP          6/14/2017 11:42:45 PM    1000 units/hr        44575 units   250      D5W

1000 units/hr HEPARIN DRIP given in lab by Katherine Mills RN in Left Forearm via Peripheral IV. P
ump/Drip Flow = 10 ml/hr using D5W with a

[place] [address] units in 250 ml.

IV Solutions          6/14/2017 10:29:35 PM      0 mL (IV)                 500      NaCl .9

Patient arrived on IV Solutions in Left Forearm via Peripheral IV. Pump/Drip Flow = 20 ml/hr using Na
Cl .9.

 

NIPRIDE             6/14/2017 11:07:39 PM     50 mcg

50 mcg NIPRIDE given in lab by Sara Thomas in Right Groin via Intra-coronary.

 

NIPRIDE             6/14/2017 11:09:41 PM     50 mcg

50 mcg NIPRIDE given in lab by Sara Thomas in Right Groin via Intra-coronary.

 

 

Initial Case Assessment

Cardiovascular

HR             Rhythm            NIBP            Chest Pain

 

110             stemi            127/88           5

 

Edema Present        Skin color              Skin

 

None            Normal                Warm Dry

 

Circulatory - Right Pulses

Dorsalis Pedis       Femoral

 

2              3

 

Scale (0,1,2,3,4,d)

 

Circulatory - Left Pulses

Dorsalis Pedis       Femoral

 

2              2

 

Scale (0,1,2,3,4,d)

 

Circulatory - Lower Extremities

Color Lower Right      Color Lower Left

 

 

Normal           Normal

 

Neurological State

              Oriented to time-place-

Alert                        Moves all extremities

              person

 

Respiration - General

Respiration Rate

              SpO2 (%)           O2 (lpm)

(B/min)

18             99              2

 

Chronological Log

Time      Study Chronological Log

 

22:15:55    Patient arrived via Bed.

22:27:03  MD arrived.

      Vitals capture started with the following parameters, Patient=Adult, Interval=5 min, Initial Pr
hsbisn=649 mmHg,

22:29:09

      Deflation Rate=5 mmHg

22:29:14  Patient Name, D.O.B, / Armband Verified By R.N.

 

22:29:18  Consent signed by the physician and the patient and verified by the Cath Lab staff.

 

22:29:19  Pre-op and post- op instructions given; patient acknowledges understanding of instructions.


 

22:29:21  Verbal Stimulation=2 Physical Stimulation=2 Airway=2 Respiration=2 TOTAL=8. (0=absent, 1=li
mited, 2=present)

 

22:29:24  Presedation assessment performed by Cath Lab RN.

 

22:29:28  Skin Breakdown- none per pt

 

22:29:30  Patient Warmer Placed on the Table.

 

22:29:31  Disposable Defibrillator Pads Placed On Patient.

 

22:29:32  Master Prominences Protected

 

22:29:33  A # 20 IV was noted in the Forearm (right). Grade = 0

 

22:29:34  A # 20 IV was noted in the Forearm (left). Grade = 0

 

22:29:35  Patient arrived on IV Solutions in Left Forearm via Peripheral IV. Pump/Drip Flow = 20 ml/h
r using NaCl .9.

 

22:29:37  History and physical on the chart or being dictated.

      Assessment: Initial Case, FB=329 BPM, Rhythm=stemi, SSBJ=698/88 mmhg, Chest Pain=5, Edema=None,


      Color=Normal, Skin = Warm, Dry

      Right Pulses: Alfonzo Ped=2, Femoral=3

      Left Pulses: Alfonzo Ped=2, Femoral=2

22:29:39

      Lower Right Extremities: Color=Normal

      Lower Left Extremities: Color=Normal

      Neurological: State=Alert, Ox3, [place]

      Respiration: Resp=18 B/min, SpO2=99 %, O2=2 lpm

22:29:46  JS=668 bpm, LOIT=720/88 mmhg, SpO2=99.0 %, Resp=18 B/min, Pain=5, Christine=10, Kiran=2

 

22:30:14  2 mg VERSED given in lab by Oleksandr Alcala, RN in Left Forearm via Peripheral IV.

 

22:30:24  Bilateral groins prepped with 2% chlorhexidine, and with a 3 min. waiting time.

 

22:31:25  50 mcg FENTANYL given in lab by Oleksandr Alcala, RN in Left Forearm via Peripheral IV.

 

22:34:39  IW=158 bpm, VDKP=173/93 mmhg, SpO2=99.0 %, Resp=21 B/min, Pain=5, Christine=10, Kiran=2

      Time Out. Correct patient, correct procedure,correct physician, power injector not loaded with 
contrast with surgical

22:35:57

      team present. Time Out Concurred by MD, individual staff in procedure

22:36:13  Case Start

 

22:36:34  20 mL 1% XYLOCAINE given in lab by Sara Thomas in Right Groin via Subcutaneous.

 

22:38:17  2 mg VERSED given in lab by Oleksandr Alcala, RN in Left Forearm via Peripheral IV.

 

22:38:24  Reference ECG taken

 

22:39:42  GC=664 bpm, FKMM=258/84 mmhg, SpO2=93.0 %, Resp=10 B/min, Pain=5, Christine=10, Kiran=2

 

22:40:11  Activated Clotting Time Drawn

 

22:40:22  Access site was Right Femoral Artery using ultrasound

 

22:40:37  A SHEATH, FR6.5 PRELUDE 11CM FR 6.5 was advanced into the Fem Art (right) using the Percuta
neous technique.

      A AR MOD INFINITI CATHETER FR 5 was advanced over a wire. OMNIPAQUE, 350 MG, 150ML 150ML was us
ed for

22:40:44

      injections.

22:41:55  The  RCA was injected and visualized at various angles. OMNIPAQUE, 350 MG, 150ML 150ML used
.

 

22:42:20  Catheter was removed

      A JL 4.0 INFINITI CATHETER FR 6 was advanced over a wire. OMNIPAQUE, 350 MG, 150ML 150ML was us
ed for

22:42:54

      injections.

22:43:15  ACT (Normal Range ) = 167

22:43:53  7000 units HEPARIN given in lab by Oleksandr Alcala, RN in Left Forearm via Peripheral IV.

 

22:44:27  The  LCA was injected and visualized at various angles. OMNIPAQUE, 350 MG, 150ML 150ML used
.

 

22:44:41  HR=91 bpm, ULCJ=499/77 mmhg, SpO2=91.0 %, Resp=10 B/min, Pain=5, Christine=10, Kiran=2

 

22:45:55  Catheter was removed

 

22:46:37  A AR 1 GUIDE CATHETER FR 6 was advanced over a wire. OMNIPAQUE, 350 MG, 150ML 150ML was use
d for injections.

 

22:48:45  Pressure channel 1 zeroed.

      Recorded Pressure: Ao, PH=479, Condition=Condition 1

22:49:16

      (Aorta) Ao 116/80/97

22:49:37  A WIRE, ATW MARKER 195CM 195CM was inserted via Fem Art (right).

 

22:49:40  DS=076 bpm, UCVO=239/82 mmhg, SpO2=96.0 %, Resp=12 B/min, Pain=5, Christine=10, Kiran=2

 

22:50:05  Interventional wire has crossed the lesion

      A CATHETER, EXPORT ASPIRATON was advanced over a wire. OMNIPAQUE, 350 MG, 150ML 150ML was used 
for

22:50:44

      injections.

22:51:28  Aspiration in progress

 

22:52:10  Catheter was removed

 

22:53:05  Activated Clotting Time Drawn

      A BALLOON, 2.0 X 12MM EUPHORA 12MM was inserted over WIRE, ATW MARKER 195CM 195CM via the Fem A
rt

22:54:15

      (right).

      A BALLOON, 2.0 X 12MM EUPHORA 12MM over a WIRE, ATW MARKER 195CM 195CM in the RCA Mid was infla
chapito using

22:54:37

      a 30 ARCADIO INDEFLATOR at 16 arcadio for 10 sec.

22:54:43  HR=82 bpm, YWTJ=666/70 mmhg, SpO2=96.0 %, Resp=14 B/min, Pain=5, Christine=10, Kiran=2

      A BALLOON, 2.0 X 12MM EUPHORA 12MM over a WIRE, ATW MARKER 195CM 195CM in the RCA Mid was infla
chapito using

22:54:58

      a 30 ARCADIO INDEFLATOR at 16 arcadio for 10 sec.

      A BALLOON, 2.0 X 12MM EUPHORA 12MM over a WIRE, ATW MARKER 195CM 195CM in the RCA Mid was infla
chapito using

22:55:11

      a 30 ARCADIO INDEFLATOR at 16 arcadio for 10 sec.

      A BALLOON, 2.0 X 12MM EUPHORA 12MM over a WIRE, ATW MARKER 195CM 195CM in the RCA Mid was infla
chapito using

22:55:19

      a 30 ARCADIO INDEFLATOR at 16 arcadio for 10 sec.

      A BALLOON, 2.0 X 12MM EUPHORA 12MM over a WIRE, ATW MARKER 195CM 195CM in the RCA Mid was infla
chapito using

22:55:25

      a 30 ARCADIO INDEFLATOR at 16 arcadio for 10 sec.

      A BALLOON, 2.0 X 12MM EUPHORA 12MM over a WIRE, ATW MARKER 195CM 195CM in the RCA Mid was infla
chapito using

22:55:37

      a 30 ARCADIO INDEFLATOR at 16 arcadio for 10 sec.

22:55:48  48.6 mL AGGRASTAT BOLUS given in lab by Katherine Mills RN in Left Forearm via Periphera
l IV.

      0.076 mcg/kg/min AGGRASTAT DRIP given in lab by Katherine Mills RN in Left Forearm via Perip
heral IV.

22:56:46

      Pump/Drip Flow = 8.5 ml/hr using NaCl .9 with a concentration of 12.5 mg in 250 ml.

22:56:54  Balloon Removed

      A STENT, 3.5 38 RESOLUTE INTEGRITY RX 3.5 38 was advanced through a AR 1 GUIDE CATHETER FR 6 ov
er a WIRE,

22:57:43

      ATW MARKER 195CM 195CM.

22:58:19  ACT (Normal Range ) = 292

      A STENT, 3.5 38 RESOLUTE INTEGRITY RX 3.5 38 was deployed using a 30 ARCADIO INDEFLATOR at 16 atmos
pheres for

22:58:30

      30 seconds in the RCA Mid.

22:59:47  Re-inflated the stent balloon in the  RCA to 9 ARCADIO for 10 seconds.

 

23:00:13  Delivery device removed

 

23:00:19  HR=98 bpm, TXDF=286/75 mmhg, SpO2=95.0 %, Resp=21 B/min, Pain=5, Christine=10, Kiran=2

 

23:04:39  HR=98 bpm, UIRG=294/75 mmhg, SpO2=95.0 %, Resp=23 B/min, Pain=5, Christine=10, Kiran=2

      A STENT, 4.0 26 RESOLUTE INTEGRITY RX 4.0 26 was advanced through a AR 1 GUIDE CATHETER FR 6 ov
er a WIRE,

23:04:54

      ATW MARKER 195CM 195CM.

      A STENT, 4.0 26 RESOLUTE INTEGRITY RX 4.0 26 was deployed using a 30 ARCADIO INDEFLATOR at 12 atmos
pheres for

23:05:06

      25 seconds in the RCA Mid.

23:05:46  Delivery device removed

23:07:39  50 mcg NIPRIDE given in lab by Sara Thomas in Right Groin via Intra-coronary.

 

23:08:26  Access site was Right Femoral Vein.

 

23:08:35  A SHEATH, FR6.5 PRELUDE 11CM FR 6.5 was advanced into the Fem Vein (right) using the Percut
aneous technique.

 

23:09:41  50 mcg NIPRIDE given in lab by Sara Thomas in Right Groin via Intra-coronary.

 

23:09:44  HR=58 bpm, ODAU=505/64 mmhg, SpO2=92.0 %, Resp=23 B/min, Pain=5, Christine=10, Kiran=2

 

23:11:44  A PACING CATHETER J CURVE FR 5 was advanced to the right ventricle. Rate = 80, Output = 5, 
MA = 3.

 

23:13:45  Wire removed

      After removing the current catheter a XBLAD 3.5 GUIDE CATHETER FR 6 was advanced over a WIRE, 3
MMJ .035 180CM

23:13:55

      180CM.

23:14:43  HR=80 bpm, NIBP=98/65 mmhg, SpO2=90.0 %, Resp=24 B/min, Pain=5, Christine=10, Kiran=2

 

23:16:23  50 mcg FENTANYL given in lab by Oleksandr Alcala RN in Left Forearm via Peripheral IV.

 

23:17:37  A WIRE, RUNTHROUGH NS FLOPPY .014 180CM 180CM was inserted via LAD Mid.

 

23:19:40  KL=832 bpm, HFBX=109/74 mmhg, SpO2=88.0 %, Resp=22 B/min, Pain=5, Christine=10, Kiran=2

 

23:22:16  Wire removed

 

23:22:40  A WIRE, WHISPER W/HYDROCOAT 190CM 190CM was inserted via LAD Mid.

 

23:24:43  HR=96 bpm, PWEB=223/69 mmhg, SpO2=89.0 %, Resp=19 B/min, Pain=5, Christine=10, Kiran=2

 

23:26:54  Wire removed

 

23:28:00  Power injector loaded by REMIGIO LOPEZ

      After removing the current catheter a AR 1 GUIDE CATHETER FR 6 was advanced over a WIRE, 3MMJ .
035 180CM

23:28:17

      180CM.

23:29:42  HR=99 bpm, AUZL=253/71 mmhg, SpO2=88.0 %, Resp=21 B/min, Pain=5, Christine=10, Kiran=2

 

23:30:35  Catheter was removed

      A PIGTAIL ANG. INFINITI CATHETER FR 5 was advanced over a wire. OMNIPAQUE, 350 MG, 150ML 150ML 
was used

23:31:27

      for injections.

23:33:21  Catheter was removed

 

23:34:41  GG=637 bpm, RSZX=110/80 mmhg, SpO2=91.0 %, Pain=5, Christine=10, Kiran=2

      A PIGTAIL ANG. INFINITI CATHETER FR 5 was advanced over a wire. OMNIPAQUE, 350 MG, 150ML 150ML 
was used

23:35:29

      for injections.

      Recorded Pressure: LV, KW=122, Condition=Condition 1

23:36:11

      (Left Ventricle) /18/37

23:36:58  The LV was injected at 10 cc/sec for a total of 30. OMNIPAQUE, 350 MG, 150ML 150ML used.

 

23:37:49  Pressure channel 1 zeroed.

      Recorded Pressure: LV, BQ=899, Condition=Condition 1

23:38:03

      (Left Ventricle) /18/37

      Recorded Pressure: LV, Ao, ZT=141, Condition=Condition 1

23:38:10  (Left Ventricle) /18/37,

      (Aorta) Ao 107/68/86

23:38:47  Catheter was removed

 

23:39:44  HX=697 bpm, ZGHO=578/77 mmhg, SpO2=88.0 %, Resp=11 B/min, Pain=5, Christine=10, Kiran=2

 

23:40:57  Case End

 

23:41:05  Catheter(s) removed without difficulty

 

23:41:11  In the Fem Art (right) the SHEATH, FR6.5 PRELUDE 11CM FR 6.5 was sutured in place by Kings Joiner RCIS(BS).

 

23:41:12  In the Fem Vein (right) the SHEATH, FR6.5 PRELUDE 11CM FR 6.5 was sutured in place by Kings Joiner RCIS(BS).

 

23:41:16  No case complications noted.

 

23:41:17  Cine recording checked.

23:41:20   Bedside Report will be given.

 

23:41:22   Contrast Scanned

 

23:41:33   A Left Heart Cath was performed.

       1000 units/hr HEPARIN DRIP given in lab by Katherine Mills RN in Left Forearm via Periphera
l IV. Pump/Drip Flow

23:42:45

       = 10 ml/hr using D5W [place] [address] units in 250 ml.

23:44:46   EI=398 bpm, PWNO=710/76 mmhg, SpO2=87.0 %, Pain=5, Christine=10, Kiran=2

 

23:49:45   FF=651 bpm, NECJ=650/79 mmhg, Pain=5, Christine=10, Kiran=2

 

23:54:46   BM=102 bpm, DBBP=810/88 mmhg, SpO2=84.0 %, Resp=8 B/min, Pain=5, Christine=10, Kiran=2

 

23:59:47   DO=914 bpm, GTNJ=061/83 mmhg, SpO2=88.0 %, Resp=18 B/min, Pain=5, Christine=10, Kiran=2

 

0:00:45    60 mg EFFIENT given in lab by Katherine Mills, RN via Oral.

 

0:05:16    QVPJ=769/92 mmhg, SpO2=90.0 %, Pain=5, Christine=10, Kiran=2

 

0:10:11    IFVY=981/94 mmhg, SpO2=92.0 %, Pain=5, Christine=10, Kiran=2

 

0:14:52    VL=710 bpm, YJVY=909/94 mmhg, SpO2=90.0 %, Pain=5, Chrisitne=10, Kiran=2

 

0:20:16    TV=694 bpm, HTAD=178/91 mmhg, SpO2=91.0 %, Resp=19 B/min, Pain=5, Christine=10, Kiran=2

 

0:24:48    QV=804 bpm, FKRZ=151/94 mmhg, SpO2=89.0 %, Pain=5, Christine=10, Kiran=2

 

0:28:28    Patient moved to stretcher

 

 

 

 

End Study - Contrast Media Used In Study

Contrast       Total Opened (mL)    Total Used (mL)      Total Wasted (mL)

 

Omnipaque       200           200            0

 

End Study - Maximum Contrast Load

Max Contrast Load (mL)

 

275.0

 

End Study - Radiation Exposure

Fluoro Time

(minutes)

21.0

 

 

End Study - Patient Disposition

Complications     Transferred To        Interventional Outcome

 

No           Critical Care Bed       a partial success

## 2017-06-15 NOTE — EKG
Date Performed: 2017       Time Performed: 21:48:02

 

PTAGE:      70 years

 

EKG:      SINUS TACHYCARDIA MODERATE INTRAVENTRICULAR CONDUCTION DELAY ST ELEVATION ELEVATION IN INFE
RIOR LEADS WITH RECIPRICAL CHANGES. CONSISTANT WITH INFERIOR POSTERIOR INJURY PATTERN ALSO CONSIDER L
ATERAL ISCHEMIA. THESE ARE NEW FINDINGS COMPARED TO 

 

 PREVIOUS TRACING            . ***ACUTE MI*** PREVIOUS TRACIN2007      13.44.08

 

DOCTOR:   Khang Hawley  Interpretating Date/Time  06/15/2017 16:53:43

## 2017-06-16 NOTE — PD.CONS
HPI


Service


[facility]ists


Consult Requested By


Dr. Tafoya


Reason for Consult


Assume medical management


Primary Care Physician


Katlin Morrow


Diagnoses:  


(1) STEMI (ST elevation myocardial infarction)


(2) Systolic CHF


(3) Ischemic cardiomyopathy


(4) Acute kidney failure


(5) Hypotension


(6) Leukocytosis


(7) CAD (coronary artery disease)


(8) Hyperlipidemia


(9) HTN (hypertension)


(10) DM (diabetes mellitus)


History of Present Illness


This is a 70-year-old male with significant past medical history hypertension, 

hyperlipidemia, type 2 diabetes.  Patient presented to the emergency room on  with complaint of retrosternal chest pain 7 out of 10 which started 

shortly after eating dinner, about 90 minutes prior to evaluation in ED.  

Patient presented to the emergency room was evaluated.  EKG obtained revealed 

an acute inferior wall MI.  Patient underwent emergent cardiac catheterization 

per Dr. Thomas.  Patient had thrombectomy, angioplasty and stent of proximal, 

mid and distal RCA.  Attempted but unsuccessful angioplasty of mid LAD.  He was 

found with severe left ventricular dysfunction.  Patient was admitted under 

intensivist services to the intensive care unit for closer monitoring.  Patient 

has been put on Effient, aspirin and Imdur.  He was also found with acute renal 

injury, ACE inhibitor as are currently on hold.  He did have an episode of 

hypotension which responded to fluid bolus.  Patient endorses history of renal 

insufficiency, creatinine is around 1.65.  States that his blood sugars have 

not been well managed at home and sometimes he doesn't take his oral 

hypoglycemics.  Chest x-ray shows some CHF and he has received Lasix.  At this 

time, patient denies any chest pain, he does have some fatigue.  Denies any 

shortness of breath.  He is somewhat anxious about discharge and been able to 

afford medications.  Hospitalist services are requested to assume medical 

management.





Review of Systems


Constitutional:  COMPLAINS OF: Fatigue,  DENIES: Diaphoretic episodes, Fever, 

Weight gain, Weight loss, Chills, Dizziness, Change in appetite, Night Sweats


Endocrine:  DENIES: Heat/cold intolerance, Polydipsia, Polyuria, Polyphagia


Eyes:  DENIES: Blurred vision, Diplopia, Eye inflammation, Eye pain, Vision loss

, Photosensitivity, Double Vision


Ears, nose, mouth, throat:  DENIES: Tinnitus, Hearing loss, Vertigo, Nasal 

discharge, Oral lesions, Throat pain, Hoarseness, Ear Pain, Running Nose, 

Epistaxis, Sinus Pain, Toothache, Odynophagia


Respiratory:  DENIES: Apneas, Cough, Snoring, Wheezing, Hemoptysis, Sputum 

production, Shortness of breath


Cardiovascular:  DENIES: Chest pain, Palpitations, Syncope, Dyspnea on Exertion

, PND, Lower Extremity Edema, Orthopnea, Claudication


Gastrointestinal:  DENIES: Abdominal pain, Black stools, Bloody stools, 

Constipation, Diarrhea, Nausea, Vomiting, Difficulty Swallowing, Anorexia


Genitourinary:  DENIES: Sexual dysfunction, Urinary frequency, Urinary 

incontinence, Urgency, Hematuria, Dysuria, Nocturia, Penile Discharge, 

Testicular Pain, Testicular Swelling


Musculoskeletal:  DENIES: Joint pain, Muscle aches, Stiffness, Joint Swelling, 

Back pain, Neck pain


Integumentary:  DENIES: Abnormal pigmentation, Nail changes, Pruritus, Rash


Hematologic/lymphatic:  DENIES: Bruising, Lymphadenopathy


Immunologic/allergic:  DENIES: Eczema, Urticaria


Neurologic:  DENIES: Abnormal gait, Headache, Localized weakness, Paresthesias, 

Seizures, Speech Problems, Tremor, Poor Balance


Psychiatric:  DENIES: Anxiety, Confusion, Mood changes, Depression, 

Hallucinations, Agitation, Suicidal Ideation, Homicidal Ideation, Delusions





Past Family Social History


Past Medical History


Type 2 diabetes, uses oral hypoglycemics.  Not compliant with taking them every 

day.


Hyperlipidemia


Hypertension


Renal insufficiency


Depression


Anxiety


Kidney stones


Constipation


Bladder cancer, has had 3 cystoscopies.  Has not had follow-up with urology for 

2 years.


Prostate cancer for which he had a biopsy but did not have any treatment as he 

could not afford it.


Past Surgical History


Left inguinal hernia repair


Reported Medications





Reported Meds & Active Scripts


Active


Catapres (Clonidine HCl) 0.2 Mg Tab 0.2 Mg PO DAILY 


Reported


Zocor (Simvastatin) 40 Mg Tab 40 Mg PO HS 


Zoloft (Sertraline HCl) 100 Mg Tab 100 Mg PO HS 


Ativan (Lorazepam) 1 Mg Tab 0.5-1 Tab PO Q8HPRN 


Glucophage (Metformin HCl) 500 Mg Tab 500 Mg PO BID 


Hctz (Hydrochlorothiazide) 25 Mg Tab 25 Mg PO DAILY 


Vasotec (Enalapril Maleate) 10 Mg Tab 10 Mg PO BID 


Toprol Xl (Metoprolol Succinate) 100 Mg Tab 100 Mg PO DAILY


Allergies:  


Coded Allergies:  


     Terazosin (Verified  Allergy, Severe, Hallucinations, 6/14/17)


Active Ordered Medications





 Inpatient Medications


Albumin Human (Albumin 25% Inj) 25 gm NOW  ONCE IV  Last administered on 6/15/

17at 18:51;  Start 6/15/17 at 18:45;  Stop 6/15/17 at 18:46;  Status DC


Albuterol/ Ipratropium (Duoneb Neb) 1 ampule Q6HR  NEB NEB  Last administered 

on  14:33;  Start 6/15/17 at 04:00


Aspirin (Aspirin Chew) 81 mg DAILY PO  Last administered on  07:41;  

Start 6/15/17 at 09:00


Atorvastatin Calcium 80 mg 80 mg HS PO  Last administered on 6/15/17at 21:06;  

Start 6/15/17 at 00:30


Carvedilol (Coreg) 3.125 mg NOW  ONCE PO  Last administered on 6/15/17at 10:33;

  Start 6/15/17 at 10:30;  Stop 6/15/17 at 10:31;  Status DC


Dextrose (D50w (Vial) Inj) 50 ml UNSCH  PRN IV HYPOGLYCEMIA-SEE COMMENTS;  

Start 6/15/17 at 01:15


Furosemide (Lasix Inj) 40 mg Q6H IV PUSH  Last administered on 6/15/17at 12:47;

  Start 6/15/17 at 00:00;  Stop 6/15/17 at 18:01;  Status DC


Glucagon (Glucagon Inj) 1 mg UNSCH  PRN OTHER HYPOGLYCEMIA-SEE COMMENTS;  Start 

6/15/17 at 01:15


Heparin Sodium (Porcine) (Heparin Inj) 5,000 units NOW  STAT IV  Last 

administered on  22:04;  Start 17 at 21:51;  Stop 17 at 21:54

;  Status DC


Heparin Sodium/ Dextrose 250 ml @ 0 mls/hr TITRATE IV ;  Start 6/15/17 at 00:30

;  Stop 6/15/17 at 10:20;  Status DC


Insulin Aspart (NovoLOG SUPPLEMENTAL SCALE) 1 ACHS SLIDING  SCALE SQ  Last 

administered on  16:40;  Start 6/15/17 at 07:00


Isosorbide Mononitrate (Imdur) 30 mg DAILY PO  Last administered on  07

:41;  Start 6/15/17 at 10:30


Lidocaine HCl (Xylocaine 2% Jelly) 1 applic UNSCH  PRN TOP CATHETER INSERTION;  

Start 6/15/17 at 00:15


Lisinopril (Prinivil) 2.5 mg DAILY PO  Last administered on  07:41;  

Start 17 at 09:00;  Status Hold


Magnesium Sulfate/ Dextrose (Magnesium Sulfate 1 Gm Premix) 100 ml @  100 mls/

hr Q1H IV  Last administered on 6/15/17at 08:10;  Start 6/15/17 at 06:15;  Stop 

6/15/17 at 08:14;  Status DC


Metoprolol Tartrate (Lopressor Inj) 5 mg Q5M SLOW IVP  Last administered on 6/15

/17at 00:06;  Start 17 at 22:00;  Stop 17 at 22:11;  Status DC


Miscellaneous (Pill Splitter) 1 ea UNSCH  PRN OTHER SEE LABEL COMMENTS;  Start 6

/15/17 at 10:30


Morphine Sulfate (Morphine Inj) 2 mg ONCE  ONCE IV PUSH  Last administered on 6/

15/17at 00:06;  Start 17 at 22:15;  Stop 17 at 22:16;  Status DC


Morphine Sulfate 2 mg 2 mg NOW  ONCE IV  Last administered on 6/15/17at 02:00;  

Start 6/15/17 at 03:45;  Stop 6/15/17 at 03:46;  Status DC


Ondansetron HCl (Zofran Inj) 4 mg Q6H  PRN IV PUSH NAUSEA OR VOMITING;  Start 6/

15/17 at 01:15


Potassium Chloride (KCl) 20 meq ONCE  ONCE PO  Last administered on  14

:00;  Start 17 at 12:00;  Stop 17 at 13:45;  Status DC


Prasugrel (Effient) 20 mg NOW  ONCE PO  Last administered on 6/15/17at 10:32;  

Start 6/15/17 at 10:30;  Stop 6/15/17 at 10:31;  Status DC


Sodium Chloride (NS 1000 ml Inj) 1,000 ml @  0 mls/hr Q0M ONCE IV  Last 

administered on  22:04;  Start 17 at 21:51;  Stop 17 at 21:54

;  Status DC


Sodium Chloride (NS Flush) 2 ml UNSCH  PRN IVF FLUSH AFTER USING IV ACCESS Last 

administered on t 22:05;  Start 17 at 22:00


Tirofiban/Sodium Chloride (Aggrastat Infusion Inj) 250 ml @  16.83 mls/ hr 

[id number] IV ;  Start 6/15/17 at 00:18;  Stop 6/15/17 at 10:20;  Status DC


Family History


Father  of myocardial infarction at age 66


Has a sister who is alive and well


Social History


Patient is not , has no children.  Has a lot of friends in the 

community.  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  He drinks socially, no 

alcohol abuse, no substance abuse.





Physical Exam


Vital Signs





 Vital Signs








  Date Time  Temp Pulse Resp B/P Pulse Ox O2 Delivery O2 Flow Rate FiO2


 


17 14:00  90      


 


17 12:00  94      


 


17 12:00 97.9 94 16 128/71 96   


 


17 10:00  88      


 


17 08:00  95      


 


17 08:00 98.0 91 19 108/62 97   





    Arterial Line    


 


17 07:15     92 Nasal Cannula 3.00 


 


17 07:00     97 Nasal Cannula 4.00 


 


17 06:00  65      


 


17 04:00 97.4 70 13 95/55 98   


 


17 04:00  70      


 


17 02:00  77      


 


17 00:00  74      


 


17 00:00 98.4 74 15 90/57 98   


 


6/15/17 22:00  84      


 


6/15/17 20:37     98 Nasal Cannula 5.00 


 


6/15/17 20:00  92      


 


6/15/17 20:00 97.5 80 25 90/57 97   


 


6/15/17 19:00     99 Nasal Cannula 4.00 


 


6/15/17 18:00  82      








Physical Exam


GENERAL: This is a well-nourished, well-developed patient, in no apparent 

distress.


SKIN: No rashes, ecchymoses or lesions. Cool and dry.


HEAD: Atraumatic. Normocephalic. No temporal or scalp tenderness.


EYES: Pupils equal round and reactive. Extraocular motions intact. No scleral 

icterus. No injection or drainage. 


ENT: Nose without bleeding, purulent drainage or septal hematoma. Throat 

without erythema, tonsillar hypertrophy or exudate. Uvula midline. Airway 

patent.


NECK: Trachea midline. No JVD or lymphadenopathy. Supple, nontender, no 

meningeal signs.


CARDIOVASCULAR: Regular rate and rhythm without murmurs, gallops, or rubs. 


RESPIRATORY: Clear to auscultation. Breath sounds equal bilaterally. No wheezes

, rales, or rhonchi.  


GASTROINTESTINAL: Abdomen soft, non-tender, nondistended. No hepato-splenomegaly

, or palpable masses. No guarding.


MUSCULOSKELETAL: Extremities without clubbing, cyanosis, or edema. No joint 

tenderness, effusion, or edema noted. No calf tenderness. Negative Homans sign 

bilaterally.


NEUROLOGICAL: Awake and alert. Cranial nerves II through XII intact.  Motor and 

sensory grossly within normal limits. Five out of 5 muscle strength in all 

muscle groups.  Normal speech.


Laboratory





Laboratory Tests








Test 6/15/17 6/16/17





 20:30 04:06


 


Activated Partial 24.3  





Thromboplast Time  


 


White Blood Count  13.5 


 


Red Blood Count  4.49 


 


Hemoglobin  13.4 


 


Hematocrit  40.8 


 


Mean Corpuscular Volume  90.9 


 


Mean Corpuscular Hemoglobin  29.9 


 


Mean Corpuscular Hemoglobin  32.9 





Concent  


 


Red Cell Distribution Width  13.6 


 


Platelet Count  160 


 


Mean Platelet Volume  8.9 


 


Neutrophils (%) (Auto)  82.5 


 


Lymphocytes (%) (Auto)  10.0 


 


Monocytes (%) (Auto)  6.9 


 


Eosinophils (%) (Auto)  0.3 


 


Basophils (%) (Auto)  0.3 


 


Neutrophils # (Auto)  11.2 


 


Lymphocytes # (Auto)  1.4 


 


Monocytes # (Auto)  0.9 


 


Eosinophils # (Auto)  0.0 


 


Basophils # (Auto)  0.0 


 


CBC Comment  DIFF FINAL 


 


Differential Comment   


 


Sodium Level  145 


 


Potassium Level  3.3 


 


Chloride Level  109 


 


Carbon Dioxide Level  26.5 


 


Anion Gap  10 


 


Blood Urea Nitrogen  36 


 


Creatinine  2.11 


 


Estimat Glomerular Filtration  31 





Rate  


 


Random Glucose  115 


 


Calcium Level  8.6 


 


Total Creatine Kinase  2596 


 


Creatine Kinase MB  149.6 


 


Creatine Kinase MB %  5.8 


 


Triglycerides Level  159 


 


Cholesterol Level  204 


 


LDL Cholesterol  131 


 


HDL Cholesterol  41.5 


 


Cholesterol/HDL Ratio  4.91 








Result Diagram:  


17 0406                                                                   

             17 0406





Imaging





Last Impressions








Chest X-Ray 17 0000 Signed





Impressions: 





 Service Date/Time:  2017 09:38 - CONCLUSION:  1. Significant 





 interval improvement in the pulmonary parenchyma compared to previous dated 





 6/15/17.     Jeffry García MD 











A/P


Diagnosis:  


(1) STEMI (ST elevation myocardial infarction)


(2) Systolic CHF


(3) CAD (coronary artery disease)


(4) Hyperlipidemia


(5) HTN (hypertension)


(6) DM (diabetes mellitus)


(7) Ischemic cardiomyopathy


(8) Renal insufficiency


(9) Acute kidney failure


(10) Leukocytosis


(11) Hypotension


(12) Bladder cancer


(13) Prostate cancer


Assessment and Plan


Thank you for this consultation we will assume medical management





70-year-old male with history hypertension, diabetes, hyperlipidemia.  

Presented with chest pressure, was found positive for acute inferior wall MI.  

Patient underwent emergent cardiac catheterization per Dr. Thomas.  Patient 

had thrombectomy, angioplasty and stent of proximal, mid and distal RCA.  

Attempted but unsuccessful angioplasty of mid LAD.  He was found with severe 

left ventricular dysfunction.


-Continue with Effient, Imdur, Coreg


ACE inhibitor on hold due to renal dysfunction


-Echocardiogram has been ordered, pending





Acute on chronic renal injury, does have underlying disease, did receive 

contrast with cardiac catheterization.


Rhabdomyolysis


-Continue to monitor BMP


Renal ultrasound has been ordered, results pending


Continue to hold ACE inhibitor at this time


Nephrology following patient





Type 2 diabetes, noncompliance with taking oral hypoglycemics


We will check hemoglobin A1c


Continue with Accu-Cheks before meals and at bedtime and insulin therapy





Hypertension, stable


Continue home medications





Hyperlipidemia,


Continue home medications





History of bladder cancer or previous cystoscopy, has not had follow-up with 

urology


Prostate cancer, had biopsy and never was treated


-Encouraged patient to follow up with consultants as outpatient.





Continue with SCDs for DVT prophylaxis


Patient waiting to be transfer out of ICU to cardiac stepdown unit


Case management consultation to assist with discharge planning


Plan of care has been discussed with the patient, attending and registered 

nurse.  Further management of the patient will be dependent on the hospital 

course





This patient was seen by myself and Dr. Fox, this consultation is written on 

his behalf





Problem Qualifiers





(1) STEMI (ST elevation myocardial infarction):  


Qualified Code:  I21.19 - ST elevation myocardial infarction (STEMI) involving 

other coronary artery of inferior wall


(2) Systolic CHF:  


Qualified Code:  I50.21 - Acute systolic congestive heart failure


(3) Acute kidney failure:  


Qualified Code:  N17.9 - Acute renal failure, unspecified acute renal failure 

type


(4) Hypotension:  


Qualified Code:  I95.9 - Hypotension, unspecified hypotension type


(5) Leukocytosis:  


Qualified Code:  D72.829 - Leukocytosis, unspecified type


(6) CAD (coronary artery disease):  


Qualified Code:  I25.110 - Coronary artery disease involving native coronary 

artery of native heart with unstable angina pectoris


(7) Hyperlipidemia:  


Qualified Code:  E78.5 - Hyperlipidemia, unspecified hyperlipidemia type


(8) HTN (hypertension):  


Qualified Code:  I10 - Essential hypertension


(9) DM (diabetes mellitus):  


Qualified Code:  E11.59 - Type 2 diabetes mellitus with other circulatory 

complication, without long-term current use of insulin


(10) Bladder cancer:  


Qualified Code:  C67.9 - Malignant neoplasm of urinary bladder, unspecified site





Glory Cabrales 2017 17:27

## 2017-06-16 NOTE — PD.CONS
HPI


Service


Nephrology


Consult Requested By


Dr. Tafoya


Reason for Consult


Acute renal insufficiency on chronic kidney disease


Primary Care Physician


Katlin Morrow


History of Present Illness


Patient is 70-year-old white male with history of diabetes, hypertension, 

bladder cancer, prostate cancer who had epigastric pain and came to the 

emergency and noted to have inferior wall MI, he underwent heart 

catheterization and stent has been placed, his creatinine was 1.8 he states 

that he has known chronic kidney disease follow with his primary physician last 

time it was getting better, he has passed kidney stone only once, he states he 

could not tolerate Terazosin, he was advised surgery radiation about 3 years 

ago for prostate cancer but he couldn't do it as it was not covered and he did 

not have money for it.  His creatinine has been increasing to 2.1 he did 

receive Lasix potassium is low at 3.3, he described a right sided lumbar pain 

which was getting worse recently, he thought he had a kidney stone again.





Review of Systems


Constitutional:  COMPLAINS OF: Fatigue


Cardiovascular:  COMPLAINS OF: Chest pain


Gastrointestinal:  COMPLAINS OF: Abdominal pain


Genitourinary:  COMPLAINS OF: Urinary frequency


Psychiatric:  COMPLAINS OF: Anxiety





Past Family Social History


Allergies:  


Coded Allergies:  


     Terazosin (Verified  Allergy, Severe, Hallucinations, 6/14/17)


Past Medical History


Chronic renal failure


Diabetes


Hypertension


Prostate cancer


Bladder cancer


Kidney stone


Hyperlipidemia


Past Surgical History


Hernia repair


Bladder cancer removed


Prostate biopsy by Dr. Penn


Glaucoma surgery cataract


Reported Medications





Reported Meds & Active Scripts


Active


Catapres (Clonidine HCl) 0.2 Mg Tab 0.2 Mg PO DAILY 


Reported


Zocor (Simvastatin) 40 Mg Tab 40 Mg PO HS 


Zoloft (Sertraline HCl) 100 Mg Tab 100 Mg PO HS 


Ativan (Lorazepam) 1 Mg Tab 0.5-1 Tab PO Q8HPRN 


Glucophage (Metformin HCl) 500 Mg Tab 500 Mg PO BID 


Hctz (Hydrochlorothiazide) 25 Mg Tab 25 Mg PO DAILY 


Vasotec (Enalapril Maleate) 10 Mg Tab 10 Mg PO BID 


Toprol Xl (Metoprolol Succinate) 100 Mg Tab 100 Mg PO DAILY


Active Ordered Medications





 Current Medications








 Medications


  (Trade)  Dose


 Ordered  Sig/Era


 Route  Start Time


 Stop Time Status Last Admin


 


  (NS Flush)  2 ml  UNSCH  PRN


 IVF  6/14/17 22:00


    6/14/17 22:05


 


 


  (Xylocaine 2%


 Jelly)  1 applic  UNSCH  PRN


 TOP  6/15/17 00:15


     


 


 


  (Aspirin Chew)  81 mg  DAILY


 PO  6/15/17 09:00


    6/16/17 07:41


 


 


  (Effient)  10 mg  DAILY


 PO  6/15/17 09:00


    6/16/17 07:41


 


 


  (Coreg)  3.125 mg  BID


 PO  6/15/17 09:00


    6/16/17 07:41


 


 


  (Prinivil)  2.5 mg  DAILY


 PO  6/15/17 09:00


   Hold 6/15/17 08:26


 


 


  (Lipitor)  80 mg  HS


 PO  6/15/17 00:30


    6/15/17 21:06


 


 


  (Zofran Inj)  4 mg  Q6H  PRN


 IV PUSH  6/15/17 01:15


     


 


 


  (D50w (Vial) Inj)  50 ml  UNSCH  PRN


 IV  6/15/17 01:15


     


 


 


  (Glucagon Inj)  1 mg  UNSCH  PRN


 OTHER  6/15/17 01:15


     


 


 


  (Imdur)  30 mg  DAILY


 PO  6/15/17 10:30


    6/16/17 07:41


 


 


  (Pill Splitter)  1 ea  UNSCH  PRN


 OTHER  6/15/17 10:30


     


 


 


  (Prinivil)  2.5 mg  DAILY


 PO  6/16/17 09:00


   Hold 6/16/17 07:41


 








Family History


Positive for coronary artery disease


Social History


He used to smoke when he was young and 20s


Alcohol use positive





Physical Exam


Vital Signs





 Vital Signs








  Date Time  Temp Pulse Resp B/P Pulse Ox O2 Delivery O2 Flow Rate FiO2


 


6/16/17 10:00  88      


 


6/16/17 08:00  95      


 


6/16/17 08:00 98.0 91 19 108/62 97   





    Arterial Line    


 


6/16/17 07:15     92 Nasal Cannula 3.00 


 


6/16/17 07:00     97 Nasal Cannula 4.00 


 


6/16/17 06:00  65      


 


6/16/17 04:00 97.4 70 13 95/55 98   


 


6/16/17 04:00  70      


 


6/16/17 02:00  77      


 


6/16/17 00:00  74      


 


6/16/17 00:00 98.4 74 15 90/57 98   


 


6/15/17 22:00  84      


 


6/15/17 20:37     98 Nasal Cannula 5.00 


 


6/15/17 20:00  92      


 


6/15/17 20:00 97.5 80 25 90/57 97   


 


6/15/17 19:00     99 Nasal Cannula 4.00 


 


6/15/17 18:00  82      


 


6/15/17 16:00 98.5 78 19 91/59 99   


 


6/15/17 16:00  76      


 


6/15/17 14:00  80      


 


6/15/17 14:00     99 Nasal Cannula 4.00 


 


6/15/17 12:00  98      


 


6/15/17 12:00 98.3 98 17 114/76 95   








Physical Exam


GENERAL: Well-nourished, well-developed patient.


SKIN: Warm and dry.


HEAD: Normocephalic.


EYES: No scleral icterus. No injection or drainage. 


NECK: Supple, trachea midline. No JVD or lymphadenopathy.


CARDIOVASCULAR: Regular rate and rhythm without murmurs, gallops, or rubs. 


RESPIRATORY: Breath sounds equal bilaterally. No accessory muscle use.


GASTROINTESTINAL: Abdomen soft, non-tender, nondistended. 


EXTREMITIES: No cyanosis, or edema. 


NEUROLOGICAL: Awake, alert, and oriented x 3. Non-focal.


Laboratory





Laboratory Tests








Test 6/15/17 6/16/17





 20:30 04:06


 


Activated Partial 24.3  





Thromboplast Time  


 


White Blood Count  13.5 


 


Red Blood Count  4.49 


 


Hemoglobin  13.4 


 


Hematocrit  40.8 


 


Mean Corpuscular Volume  90.9 


 


Mean Corpuscular Hemoglobin  29.9 


 


Mean Corpuscular Hemoglobin  32.9 





Concent  


 


Red Cell Distribution Width  13.6 


 


Platelet Count  160 


 


Mean Platelet Volume  8.9 


 


Neutrophils (%) (Auto)  82.5 


 


Lymphocytes (%) (Auto)  10.0 


 


Monocytes (%) (Auto)  6.9 


 


Eosinophils (%) (Auto)  0.3 


 


Basophils (%) (Auto)  0.3 


 


Neutrophils # (Auto)  11.2 


 


Lymphocytes # (Auto)  1.4 


 


Monocytes # (Auto)  0.9 


 


Eosinophils # (Auto)  0.0 


 


Basophils # (Auto)  0.0 


 


CBC Comment  DIFF FINAL 


 


Differential Comment   


 


Sodium Level  145 


 


Potassium Level  3.3 


 


Chloride Level  109 


 


Carbon Dioxide Level  26.5 


 


Anion Gap  10 


 


Blood Urea Nitrogen  36 


 


Creatinine  2.11 


 


Estimat Glomerular Filtration  31 





Rate  


 


Random Glucose  115 


 


Calcium Level  8.6 


 


Total Creatine Kinase  2596 


 


Creatine Kinase MB  149.6 


 


Creatine Kinase MB %  5.8 


 


Triglycerides Level  159 


 


Cholesterol Level  204 


 


LDL Cholesterol  131 


 


HDL Cholesterol  41.5 


 


Cholesterol/HDL Ratio  4.91 








Result Diagram:  


6/16/17 0406                                                                   

             6/16/17 0406





Imaging





Last Impressions








Chest X-Ray 6/16/17 0000 Signed





Impressions: 





 Service Date/Time:  Friday, June 16, 2017 09:38 - CONCLUSION:  1. Significant 





 interval improvement in the pulmonary parenchyma compared to previous dated 





 6/15/17.     Jeffry García MD 











Assessment and Plan


Problem List:  


(1) Acute kidney failure


Plan:  Patient has mild rhabdomyolysis and the he also underwent heart 

catheterization with dye combination of this could lead to kidney dysfunction 

however he is still passing urine and I have ordered to replace potassium and 

obtain a baseline kidney ultrasound


Follow BMP


Avoid further dye studies or gadolinium or NSAID





(2) STEMI (ST elevation myocardial infarction)


Plan:  Status post LAD stent





(3) DM (diabetes mellitus)


Plan:  Continue to monitor blood glucose





(4) HTN (hypertension)


Plan:  Monitor it has been low induration





(5) Bladder cancer


Plan:  Follows with urology





(6) Prostate cancer


Plan:  He was taking finasteride








Problem Qualifiers





(1) STEMI (ST elevation myocardial infarction):  


Qualified Code:  I21.19 - ST elevation myocardial infarction (STEMI) involving 

other coronary artery of inferior wall


(2) HTN (hypertension):  


Qualified Code:  I10 - Essential hypertension





Bhavya Bonilla MD Jun 16, 2017 12:10

## 2017-06-16 NOTE — RADRPT
EXAM DATE/TIME:  06/16/2017 09:38 

 

[facility] COMPARISON:     

CHEST SINGLE AP, Lisbeth 15, 2017, 1:13.

 

                     

INDICATIONS :     

Respiratory Disease.

                     

 

MEDICAL HISTORY :     

Diabetes mellitus type II.          

 

SURGICAL HISTORY :     

None.   

 

ENCOUNTER:     

Subsequent                                        

 

ACUITY:     

3 days      

 

PAIN SCORE:     

0/10

 

LOCATION:     

Bilateral chest 

 

FINDINGS:     

The heart is mildly enlarged. There are chronic appearing interstitial changes. The overall appearanc
e of the parenchyma has improved considerably since previous of 6/15/17. This would suggest resolving
 congestive failure.

 

The visualized bony structures are grossly intact.

 

CONCLUSION:     

1. Significant interval improvement in the pulmonary parenchyma compared to previous dated 6/15/17.

 

 

 

 Jeffry García MD on June 16, 2017 at 10:21           

Board Certified Radiologist.

 This report was verified electronically.

## 2017-06-16 NOTE — PD.CARD.PN
Subjective


Subjective Remarks


No CP or SOB, feels much better





Objective


Medications





 Current Medications








 Medications


  (Trade)  Dose


 Ordered  Sig/Era


 Route  Start Time


 Stop Time Status Last Admin


 


  (NS Flush)  2 ml  UNSCH  PRN


 IVF  6/14/17 22:00


    6/14/17 22:05


 


 


  (Xylocaine 2%


 Jelly)  1 applic  UNSCH  PRN


 TOP  6/15/17 00:15


     


 


 


  (Aspirin Chew)  81 mg  DAILY


 PO  6/15/17 09:00


    6/16/17 07:41


 


 


  (Effient)  10 mg  DAILY


 PO  6/15/17 09:00


    6/16/17 07:41


 


 


  (Coreg)  3.125 mg  BID


 PO  6/15/17 09:00


    6/16/17 07:41


 


 


  (Prinivil)  2.5 mg  DAILY


 PO  6/15/17 09:00


   Hold 6/15/17 08:26


 


 


  (Lipitor)  80 mg  HS


 PO  6/15/17 00:30


    6/15/17 21:06


 


 


  (Zofran Inj)  4 mg  Q6H  PRN


 IV PUSH  6/15/17 01:15


     


 


 


  (D50w (Vial) Inj)  50 ml  UNSCH  PRN


 IV  6/15/17 01:15


     


 


 


  (Glucagon Inj)  1 mg  UNSCH  PRN


 OTHER  6/15/17 01:15


     


 


 


  (Imdur)  30 mg  DAILY


 PO  6/15/17 10:30


    6/16/17 07:41


 


 


  (Pill Splitter)  1 ea  UNSCH  PRN


 OTHER  6/15/17 10:30


     


 


 


  (Prinivil)  2.5 mg  DAILY


 PO  6/16/17 09:00


   Hold 6/16/17 07:41


 


 


  (KCl)  20 meq  ONCE  ONCE


 PO  6/16/17 12:00


 6/16/17 12:01   


 








Vital Signs / I&O





 Vital Signs








  Date Time  Temp Pulse Resp B/P Pulse Ox O2 Delivery O2 Flow Rate FiO2


 


6/16/17 12:00  94      


 


6/16/17 12:00 97.9 94 16 128/71 96   


 


6/16/17 10:00  88      


 


6/16/17 08:00  95      


 


6/16/17 08:00 98.0 91 19 108/62 97   





    Arterial Line    


 


6/16/17 07:15     92 Nasal Cannula 3.00 


 


6/16/17 07:00     97 Nasal Cannula 4.00 


 


6/16/17 06:00  65      


 


6/16/17 04:00 97.4 70 13 95/55 98   


 


6/16/17 04:00  70      


 


6/16/17 02:00  77      


 


6/16/17 00:00  74      


 


6/16/17 00:00 98.4 74 15 90/57 98   


 


6/15/17 22:00  84      


 


6/15/17 20:37     98 Nasal Cannula 5.00 


 


6/15/17 20:00  92      


 


6/15/17 20:00 97.5 80 25 90/57 97   


 


6/15/17 19:00     99 Nasal Cannula 4.00 


 


6/15/17 18:00  82      


 


6/15/17 16:00 98.5 78 19 91/59 99   


 


6/15/17 16:00  76      


 


6/15/17 14:00  80      


 


6/15/17 14:00     99 Nasal Cannula 4.00 








 I/O








 6/15/17 6/15/17 6/15/17 6/16/17 6/16/17 6/16/17





 07:00 15:00 23:00 07:00 15:00 23:00


 


Intake Total 156 ml 1050 ml 1110 ml 387 ml  


 


Output Total 1500 ml 1450 ml 425 ml 200 ml  


 


Balance -1344 ml -400 ml 685 ml 187 ml  


 


      


 


Intake Oral  500 ml 350 ml 360 ml  


 


IV Total 156 ml 550 ml 760 ml 27 ml  


 


Output Urine Total 1500 ml 1450 ml 425 ml 200 ml  


 


# Bowel Movements  0 0   








Physical Exam


GENERAL: In NAD


SKIN: Warm and dry.


HEAD: Normocephalic.


EYES: No scleral icterus. No injection or drainage. 


NECK: Supple, trachea midline. No JVD or lymphadenopathy.


CARDIOVASCULAR: Regular rate and rhythm without murmurs, gallops, or rubs. 


RESPIRATORY: Breath sounds equal bilaterally. No accessory muscle use.


GASTROINTESTINAL: Abdomen soft, non-tender, nondistended. 


MUSCULOSKELETAL: No cyanosis, or edema. 


Groin stable, no hematoma





Laboratory





Laboratory Tests








Test 6/15/17 6/16/17





 20:30 04:06


 


Activated Partial 24.3 SEC 





Thromboplast Time  


 


White Blood Count  13.5 TH/MM3


 


Red Blood Count  4.49 MIL/MM3


 


Hemoglobin  13.4 GM/DL


 


Hematocrit  40.8 %


 


Mean Corpuscular Volume  90.9 FL


 


Mean Corpuscular Hemoglobin  29.9 PG


 


Mean Corpuscular Hemoglobin  32.9 %





Concent  


 


Red Cell Distribution Width  13.6 %


 


Platelet Count  160 TH/MM3


 


Mean Platelet Volume  8.9 FL


 


Neutrophils (%) (Auto)  82.5 %


 


Lymphocytes (%) (Auto)  10.0 %


 


Monocytes (%) (Auto)  6.9 %


 


Eosinophils (%) (Auto)  0.3 %


 


Basophils (%) (Auto)  0.3 %


 


Neutrophils # (Auto)  11.2 TH/MM3


 


Lymphocytes # (Auto)  1.4 TH/MM3


 


Monocytes # (Auto)  0.9 TH/MM3


 


Eosinophils # (Auto)  0.0 TH/MM3


 


Basophils # (Auto)  0.0 TH/MM3


 


CBC Comment  DIFF FINAL 


 


Differential Comment   


 


Sodium Level  145 MEQ/L


 


Potassium Level  3.3 MEQ/L


 


Chloride Level  109 MEQ/L


 


Carbon Dioxide Level  26.5 MEQ/L


 


Anion Gap  10 MEQ/L


 


Blood Urea Nitrogen  36 MG/DL


 


Creatinine  2.11 MG/DL


 


Estimat Glomerular Filtration  31 ML/MIN





Rate  


 


Random Glucose  115 MG/DL


 


Calcium Level  8.6 MG/DL


 


Total Creatine Kinase  2596 U/L


 


Creatine Kinase MB  149.6 NG/ML


 


Creatine Kinase MB %  5.8 %


 


Triglycerides Level  159 MG/DL


 


Cholesterol Level  204 MG/DL


 


LDL Cholesterol  131 MG/DL


 


HDL Cholesterol  41.5 MG/DL


 


Cholesterol/HDL Ratio  4.91 RATIO








Imaging





Last Impressions








Chest X-Ray 6/16/17 0000 Signed





Impressions: 





 Service Date/Time:  Friday, June 16, 2017 09:38 - CONCLUSION:  1. Significant 





 interval improvement in the pulmonary parenchyma compared to previous dated 





 6/15/17.     Jeffry García MD 











Assessment and Plan


Problem List:  


(1) STEMI (ST elevation myocardial infarction)


(2) CAD (coronary artery disease)


(3) Systolic CHF


(4) Ischemic cardiomyopathy


(5) DM (diabetes mellitus)


(6) HTN (hypertension)


(7) Hyperlipidemia


(8) Renal insufficiency


Assessment and Plan


Stable post MI and PCI. Continue post MI care. Groin stable. Continue platelet 

inhibition with Effient and baby ASA. Continue low dose beta blocker, hold ACE 

inhibitor (creatinine increased), but restart once renal fx stabilized. Monitor 

renal fx. Increase activity. Transfer to [facility]. Echo to reevaluate LV fx on Mon.





Problem Qualifiers





(1) STEMI (ST elevation myocardial infarction):  


Qualified Code:  I21.19 - ST elevation myocardial infarction (STEMI) involving 

other coronary artery of inferior wall


(2) HTN (hypertension):  


Qualified Code:  I10 - Essential hypertension





Sara Thomas MD Jun 16, 2017 12:36

## 2017-06-16 NOTE — RADRPT
EXAM DATE/TIME:  06/16/2017 17:00 

 

[facility] COMPARISON:     

No previous studies available for comparison.

        

 

 

INDICATIONS :     

Increased BUN/Creatinine.

                     

 

MEDICAL HISTORY :     

Hypercholesterolemia. Methicillin-resistant Staphylococcus aureus. Renal calculi. Sleep apnea. Diabet
es. Hypertension.

 

SURGICAL HISTORY :     

Inguinal hernia repair.     

 

ENCOUNTER:     

Initial

 

ACUITY:     

1 day

 

PAIN SCORE:     

0/10

 

LOCATION:     

Bilateral flank 

MEASUREMENTS:     

 

RIGHT KIDNEY:     

10.4 x 4.7 x 4.8 cm

 

LEFT KIDNEY:     

10.4 x 4.3 x 5.8 cm

 

FINDINGS:     

 

RIGHT KIDNEY:     

Renal cortex is normal in thickness and echotexture.  No hydronephrosis, stone, or mass.  

 

LEFT KIDNEY:     

Renal cortex is normal in thickness and echotexture. There is no hydronephrosis. The examination does
 demonstrate a 6 mm stone evident within the midpole collecting system.

 

BLADDER:     

Within normal limits given the degree of distension.  

 

CONCLUSION:     

1. 6 mm stone in the midpole collecting system on the right.

2. Kidneys are otherwise unremarkable in appearance with no evidence of obstruction.

 

 

 

 Jeffry García MD on June 16, 2017 at 17:28           

Board Certified Radiologist.

 This report was verified electronically.

## 2017-06-16 NOTE — PD.TRANSFR
Transfer Summary


Admission Date


Jun 14, 2017 at 21:55


Admitting Diagnosis


STEMI ALERT


Diagnoses:  


(1) STEMI (ST elevation myocardial infarction)


Diagnosis:  Principal





(2) Systolic CHF


Diagnosis:  Principal





(3) Ischemic cardiomyopathy


Diagnosis:  Principal





(4) Acute kidney failure


Diagnosis:  Principal





(5) Hypotension


Diagnosis:  Principal





(6) Leukocytosis


Diagnosis:  Principal





(7) CAD (coronary artery disease)


Diagnosis:  Secondary





(8) Hyperlipidemia


Diagnosis:  Secondary





(9) HTN (hypertension)


Diagnosis:  Secondary





(10) DM (diabetes mellitus)


Diagnosis:  Secondary





Transfer Summary/Subjective


70 years old and complains of retrosternal chest pain 7/10 which started 

shortly after he ate a roast beef dinner, about 90 minutes prior to the time of 

evaluation.  The pain was constant.  He denied radiation of the pain however 

noted that this morning he had some pain in the region of the right flank which 

seemed to radiate upward towards the right chest.  That pain went away as the 

patient worked today at the [employer].  He reports a family history of 

coronary artery disease with his father suffering an MI.  He has no prior 

history of coronary artery disease.  At 21:48 and EKG was obtained revealing an 

inferior wall acute myocardial infarction.  Cardiologist was notified and the 

patient went immediately to cardiac catheterization for successful percutaneous 

intervention.





SUBJ 6/16/17: Denies chest pain today.  Mild hypotension yesterday has now 

resolved.  Lisinopril placed on hold as the creatinine has increased to 2.1. UO 

2L in 24 hours





Objective





 Vital Signs








  Date Time  Temp Pulse Resp B/P Pulse Ox O2 Delivery O2 Flow Rate FiO2


 


6/16/17 07:15     92 Nasal Cannula 3.00 


 


6/16/17 06:00  65      


 


6/16/17 04:00 97.4  13 95/55    


 


6/15/17 03:02        100








 Intake and Output








 6/15/17 6/15/17 6/15/17





 07:59 15:59 23:59


 


Intake Total 156 ml 1050 ml 1110 ml


 


Output Total 1500 ml 1450 ml 425 ml


 


Balance -1344 ml -400 ml 685 ml








Result Diagram:  


6/16/17 0406                                                                   

             6/16/17 0406





Imaging





Last 24 hours Impressions








Chest X-Ray 6/14/17 3045 Signed





Impressions: 





 Service Date/Time:  Wednesday, June 14, 2017 22:01 - CONCLUSION:  Minimal 





 bibasilar atelectasis. Borderline cardiomegaly. Tortuous and atherosclerotic 





 aorta.     Michelet Larios MD 








Objective Remarks


GENERAL: Well-nourished, well-developed patient.


SKIN: Warm and dry.


HEAD: Normocephalic.


EYES: No scleral icterus. No injection or drainage. 


NECK: Supple, trachea midline. No JVD or lymphadenopathy.


CARDIOVASCULAR: Regular rate and rhythm without murmurs, gallops, or rubs. 


RESPIRATORY: Breath sounds equal bilaterally. No accessory muscle use.


GASTROINTESTINAL: Abdomen soft, non-tender, nondistended. 


MUSCULOSKELETAL: No cyanosis, or edema. 


BACK: Nontender without obvious deformity. No CVA tenderness.


EXTREMITIES: No clubbing cyanosis or edema


NEURO: AOx3, No focal deficits





A/P


Assessment and Plan


STEMI


- Emergent cardiac catheterization with successful percutaneous intervention


- Continue Effient, aspirin, Imdur


- Lisinopril on hold due to JOEY


- Continue atorvastatin





Hypotension


- Responded to fluid bolus, now resolved





Systolic heart failure, ischemic cardiac myopathy


- Continue beta blockers,hold ACE I due to JOEY


- Received 4 doses of IV Lasix yesterday


- Repeat chest x-ray today





Dyslipidemia


- Atorvastatin





Diabetes mellitus


- Hold by mouth antidiabetic medications while in the ICU


- Insulin sliding scale





DVT GI prophylaxis


- Heparin 5000 units sq q12


- Teds SCDs


- ADA diet





Critical Care:


The total critical care time was 35 minutes. Time to perform other separately 

billable procedures was not included in the critical care time.





Consult hospitalist to assume care in a.m. transfer to [facility] with telemetry








Britton Tafoya MD Jun 16, 2017 08:07

## 2017-06-16 NOTE — EKG
Date Performed: 06/16/2017       Time Performed: 08:23:20

 

PTAGE:      70 years

 

EKG:      Sinus rhythm 

 

 LOW QRS VOLTAGE IN PRECORDIAL LEADS INFERIOR MYOCARDIAL INFARCTION , OF INDETERMINATE AGE MODERATE T
-WAVE ABNORMALITY, CONSIDER ANTEROLATERAL ISCHEMIA ABNORMAL ECG

 

PREVIOUS TRACING       : 06/14/2017 21.48

 

DOCTOR:   Prem Khan  Interpretating Date/Time  06/16/2017 22:22:32

## 2017-06-17 NOTE — PD.CARD.PN
Subjective


Subjective Remarks


No CP or SOB, feels tired





Objective


Medications





 Current Medications








 Medications


  (Trade)  Dose


 Ordered  Sig/Era


 Route  Start Time


 Stop Time Status Last Admin


 


  (NS Flush)  2 ml  UNSCH  PRN


 IVF  6/14/17 22:00


    6/16/17 20:43


 


 


  (Xylocaine 2%


 Jelly)  1 applic  UNSCH  PRN


 TOP  6/15/17 00:15


     


 


 


  (Aspirin Chew)  81 mg  DAILY


 PO  6/15/17 09:00


    6/16/17 07:41


 


 


  (Effient)  10 mg  DAILY


 PO  6/15/17 09:00


    6/16/17 07:41


 


 


  (Coreg)  3.125 mg  BID


 PO  6/15/17 09:00


    6/16/17 20:42


 


 


  (Prinivil)  2.5 mg  DAILY


 PO  6/15/17 09:00


   Hold 6/15/17 08:26


 


 


  (Lipitor)  80 mg  HS


 PO  6/15/17 00:30


    6/16/17 20:42


 


 


  (Zofran Inj)  4 mg  Q6H  PRN


 IV PUSH  6/15/17 01:15


     


 


 


  (D50w (Vial) Inj)  50 ml  UNSCH  PRN


 IV  6/15/17 01:15


     


 


 


  (Glucagon Inj)  1 mg  UNSCH  PRN


 OTHER  6/15/17 01:15


     


 


 


  (Imdur)  30 mg  DAILY


 PO  6/15/17 10:30


    6/16/17 07:41


 


 


  (Pill Splitter)  1 ea  UNSCH  PRN


 OTHER  6/15/17 10:30


     


 


 


  (Prinivil)  2.5 mg  DAILY


 PO  6/16/17 09:00


   Hold 6/16/17 07:41


 








Vital Signs / I&O





 Vital Signs








  Date Time  Temp Pulse Resp B/P Pulse Ox O2 Delivery O2 Flow Rate FiO2


 


6/17/17 06:00  96      


 


6/17/17 05:00  88      


 


6/17/17 04:00  78      


 


6/17/17 03:21 97.4 87 24 121/75 94   


 


6/17/17 03:00  78      


 


6/17/17 02:00  74      


 


6/17/17 01:00  78      


 


6/17/17 00:08 99.2 84 20 108/67 100   


 


6/17/17 00:00  80      


 


6/16/17 23:00  82      


 


6/16/17 22:00  82      


 


6/16/17 21:00  86      


 


6/16/17 20:00  98      


 


6/16/17 19:44     97 Nasal Cannula 2.00 


 


6/16/17 19:44 99.6 97 20 114/68 97   


 


6/16/17 19:00  87      


 


6/16/17 18:00  90      


 


6/16/17 17:00  90      


 


6/16/17 16:45 98.5 88 18 110/73 98   


 


6/16/17 14:00  90      


 


6/16/17 12:00  94      


 


6/16/17 12:00 97.9 94 16 128/71 96   


 


6/16/17 10:00  88      








 I/O








 6/16/17 6/16/17 6/16/17 6/17/17 6/17/17 6/17/17





 07:00 15:00 23:00 07:00 15:00 23:00


 


Intake Total 387 ml 500 ml  480 ml  


 


Output Total 200 ml 350 ml  0 ml  


 


Balance 187 ml 150 ml  480 ml  


 


      


 


Intake Oral 360 ml 500 ml  480 ml  


 


IV Total 27 ml   0 ml  


 


Output Urine Total 200 ml 350 ml    


 


Emesis    0 ml  


 


# Voids    3  


 


# Bowel Movements  0  0  








Physical Exam


GENERAL: In NAD


SKIN: Warm and dry.


HEAD: Normocephalic.


EYES: No scleral icterus. No injection or drainage. 


NECK: Supple, trachea midline. No JVD or lymphadenopathy.


CARDIOVASCULAR: Regular rate and rhythm without murmurs, gallops, or rubs. 


RESPIRATORY: Breath sounds equal bilaterally. No accessory muscle use.


GASTROINTESTINAL: Abdomen soft, non-tender, nondistended. 


MUSCULOSKELETAL: No cyanosis, or edema. 


Groin stable, no hematoma





Laboratory





Laboratory Tests








Test 6/17/17





 04:53


 


White Blood Count 11.1 TH/MM3


 


Red Blood Count 3.87 MIL/MM3


 


Hemoglobin 11.7 GM/DL


 


Hematocrit 35.0 %


 


Mean Corpuscular Volume 90.3 FL


 


Mean Corpuscular Hemoglobin 30.1 PG


 


Mean Corpuscular Hemoglobin 33.4 %





Concent 


 


Red Cell Distribution Width 13.4 %


 


Platelet Count 138 TH/MM3


 


Mean Platelet Volume 9.4 FL


 


Neutrophils (%) (Auto) 74.2 %


 


Lymphocytes (%) (Auto) 13.0 %


 


Monocytes (%) (Auto) 8.3 %


 


Eosinophils (%) (Auto) 1.3 %


 


Basophils (%) (Auto) 3.2 %


 


Neutrophils # (Auto) 8.3 TH/MM3


 


Lymphocytes # (Auto) 1.5 TH/MM3


 


Monocytes # (Auto) 0.9 TH/MM3


 


Eosinophils # (Auto) 0.1 TH/MM3


 


Basophils # (Auto) 0.4 TH/MM3


 


CBC Comment DIFF FINAL 


 


Differential Comment  


 


Sodium Level 140 MEQ/L


 


Potassium Level 3.5 MEQ/L


 


Chloride Level 105 MEQ/L


 


Carbon Dioxide Level 23.3 MEQ/L


 


Anion Gap 12 MEQ/L


 


Blood Urea Nitrogen 45 MG/DL


 


Creatinine 2.08 MG/DL


 


Estimat Glomerular Filtration 32 ML/MIN





Rate 


 


Random Glucose 119 MG/DL


 


Calcium Level 8.6 MG/DL


 


Phosphorus Level 3.3 MG/DL


 


Total Creatine Kinase 781 U/L


 


Creatine Kinase MB 16.7 NG/ML


 


Creatine Kinase MB % 2.1 %








Imaging





Last Impressions








Renal Ultrasound 6/16/17 0000 Signed





Impressions: 





 Service Date/Time:  Friday, June 16, 2017 17:00 - CONCLUSION:  1. 6 mm stone 

in 





 the midpole collecting system on the right. 2. Kidneys are otherwise 





 unremarkable in appearance with no evidence of obstruction.     Jeffry García MD 


 


Chest X-Ray 6/16/17 0000 Signed





Impressions: 





 Service Date/Time:  Friday, June 16, 2017 09:38 - CONCLUSION:  1. Significant 





 interval improvement in the pulmonary parenchyma compared to previous dated 





 6/15/17.     Jeffry García MD 











Assessment and Plan


Problem List:  


(1) STEMI (ST elevation myocardial infarction)


(2) CAD (coronary artery disease)


(3) Systolic CHF


(4) Ischemic cardiomyopathy


(5) DM (diabetes mellitus)


(6) HTN (hypertension)


(7) Hyperlipidemia


(8) Renal insufficiency


Assessment and Plan


Stable post MI and RCA PCI. Continue post MI care. Monitor on telemetry. Groin 

remains stable. No arrhythmias. Continue platelet inhibition with Effient and 

baby ASA. Continue low dose beta blocker, high dose statin statin, and Imdur; 

hold ACE inhibitor (creatinine increased), but restart once renal fx 

stabilized. Monitor renal fx. Renal US c/w nephrolithiasis. Increase activity. 

Echo to reevaluate LV fx on Mon, if significantly depressed, will proceed with 

LifeVest placement before discharge.





Problem Qualifiers





(1) STEMI (ST elevation myocardial infarction):  


Qualified Code:  I21.19 - ST elevation myocardial infarction (STEMI) involving 

other coronary artery of inferior wall


(2) CAD (coronary artery disease):  


Qualified Code:  I25.110 - Coronary artery disease involving native coronary 

artery of native heart with unstable angina pectoris


(3) Systolic CHF:  


Qualified Code:  I50.21 - Acute systolic congestive heart failure


(4) DM (diabetes mellitus):  


Qualified Code:  E11.59 - Type 2 diabetes mellitus with other circulatory 

complication, without long-term current use of insulin


(5) HTN (hypertension):  


Qualified Code:  I10 - Essential hypertension


(6) Hyperlipidemia:  


Qualified Code:  E78.5 - Hyperlipidemia, unspecified hyperlipidemia type





Sara Thomas MD Jun 17, 2017 08:12

## 2017-06-17 NOTE — HHI.PR
Subjective


Subjective Remarks


no cp


no sob


anxious


no acute changes overnight


tele reviewed, SR with triplets





Review of Systems


Constitutional


Constitutional Remarks


12 point ros completed, negative except as noted above





Vitals/Results


Intake & Output











 6/16/17 6/16/17 6/17/17





 15:00 23:00 07:00


 


Intake Total 500 ml  480 ml


 


Output Total 350 ml  0 ml


 


Balance 150 ml  480 ml


 


   


 


Intake Oral 500 ml  480 ml


 


IV Total   0 ml


 


Output Urine Total 350 ml  


 


Emesis   0 ml


 


# Voids   3


 


# Bowel Movements 0  0








Vital Signs





 Vital Signs








  Date Time  Temp Pulse Resp B/P Pulse Ox O2 Delivery O2 Flow Rate FiO2


 


6/17/17 09:26     92   


 


6/17/17 08:00 98.5 89 20 114/73 95   


 


6/17/17 08:00     95 Room Air  


 


6/17/17 07:00  77      


 


6/17/17 06:00  96      


 


6/17/17 05:00  88      


 


6/17/17 04:00  78      


 


6/17/17 03:21 97.4 87 24 121/75 94   


 


6/17/17 03:00  78      


 


6/17/17 02:00  74      


 


6/17/17 01:00  78      


 


6/17/17 00:08 99.2 84 20 108/67 100   


 


6/17/17 00:00  80      


 


6/16/17 23:00  82      


 


6/16/17 22:00  82      


 


6/16/17 21:00  86      


 


6/16/17 20:00  98      


 


6/16/17 19:44     97 Nasal Cannula 2.00 


 


6/16/17 19:44 99.6 97 20 114/68 97   


 


6/16/17 19:00  87      


 


6/16/17 18:00  90      


 


6/16/17 17:00  90      


 


6/16/17 16:45 98.5 88 18 110/73 98   


 


6/16/17 14:00  90      


 


6/16/17 12:00  94      


 


6/16/17 12:00 97.9 94 16 128/71 96   








CBC/BMP:  


6/17/17 0453                                                                   

             6/17/17 0453





Lab Results





Laboratory Tests








Test 6/17/17





 04:53


 


White Blood Count 11.1 TH/MM3


 


Red Blood Count 3.87 MIL/MM3


 


Hemoglobin 11.7 GM/DL


 


Hematocrit 35.0 %


 


Mean Corpuscular Volume 90.3 FL


 


Mean Corpuscular Hemoglobin 30.1 PG


 


Mean Corpuscular Hemoglobin 33.4 %





Concent 


 


Red Cell Distribution Width 13.4 %


 


Platelet Count 138 TH/MM3


 


Mean Platelet Volume 9.4 FL


 


Neutrophils (%) (Auto) 74.2 %


 


Lymphocytes (%) (Auto) 13.0 %


 


Monocytes (%) (Auto) 8.3 %


 


Eosinophils (%) (Auto) 1.3 %


 


Basophils (%) (Auto) 3.2 %


 


Neutrophils # (Auto) 8.3 TH/MM3


 


Lymphocytes # (Auto) 1.5 TH/MM3


 


Monocytes # (Auto) 0.9 TH/MM3


 


Eosinophils # (Auto) 0.1 TH/MM3


 


Basophils # (Auto) 0.4 TH/MM3


 


CBC Comment DIFF FINAL 


 


Differential Comment  


 


Sodium Level 140 MEQ/L


 


Potassium Level 3.5 MEQ/L


 


Chloride Level 105 MEQ/L


 


Carbon Dioxide Level 23.3 MEQ/L


 


Anion Gap 12 MEQ/L


 


Blood Urea Nitrogen 45 MG/DL


 


Creatinine 2.08 MG/DL


 


Estimat Glomerular Filtration 32 ML/MIN





Rate 


 


Random Glucose 119 MG/DL


 


Calcium Level 8.6 MG/DL


 


Phosphorus Level 3.3 MG/DL


 


Total Creatine Kinase 781 U/L


 


Creatine Kinase MB 16.7 NG/ML


 


Creatine Kinase MB % 2.1 %











Physical Exam


General


General Appearance:  Well Developed, No Acute Distress





Eyes


Eye Exam:  Pupils Equal, Pupils Reactive





Ears & Nose


Ears & Nose Exam:  Nasal Mucosa Pink





Throat


Throat Exam:  Oral Mucosa Pink & Moist





Neck


Neck Exam:  Neck Supple





Pulmonary


Resp Exam:  Clear Bilaterally, Breath Sounds Equal





Cardiology


CV Exam:  Regular, Normal Sinus Rhythm





Gastrointestinal/Abdomen


GI Exam:  Soft, Non-Tender, Bowel Sounds Present, Non-Distended





Musculoskeletal


MS Exam:  Joints Intact





Integumentary


Skin Exam:  Warm, Dry





Extremeties


Extremities Exam:  No Edema, Pedal Pulses Palpable





Neurologic


Neuro Exam:  Alert, Awake, Oriented, Speech Clear, Moving All Extremities, No 

Focal Deficits





Psychiatric


Psych Exam:  Appropriate Responses





VTE Prophylaxis


VTE Prophylaxis Device:  SCDs





Assessment/Plan


Problem List:  


(1) STEMI (ST elevation myocardial infarction)


(2) Systolic CHF


(3) Ischemic cardiomyopathy


(4) Acute kidney failure


(5) Hypotension


(6) Leukocytosis


(7) CAD (coronary artery disease)


(8) Hyperlipidemia


(9) HTN (hypertension)


(10) DM (diabetes mellitus)


Assessment/Plan


70-year-old male with history hypertension, diabetes, hyperlipidemia.  

Presented with chest pressure, was found positive for acute inferior wall MI.  

Patient underwent emergent cardiac catheterization per Dr. Thomas.  Patient 

had thrombectomy, angioplasty and stent of proximal, mid and distal RCA.  

Attempted but unsuccessful angioplasty of mid LAD.  He was found with severe 

left ventricular dysfunction.


-Continue with Effient, Imdur, Coreg


ACE inhibitor on hold due to renal dysfunction


-Echocardiogram has been ordered for Monday to eval LV function, if depressed, 

will need Life Vest





Acute on chronic renal injury, does have underlying disease, did receive 

contrast with cardiac catheterization.


Rhabdomyolysis


-Continue to monitor BMP


Renal ultrasound has been ordered, 6 mm stone mid pole right kidney 


Continue to hold ACE inhibitor at this time


Nephrology following patient


-creat with some improvement 





Type 2 diabetes, noncompliance with taking oral hypoglycemics


pending hemoglobin A1c


Continue with Accu-Cheks before meals and at bedtime and insulin therapy





Hypertension, stable, BP 100s 


Continue home medications





Hyperlipidemia,


Continue home medications





History of bladder cancer or previous cystoscopy, has not had follow-up with 

urology


Prostate cancer, had biopsy and never was treated


-Encouraged patient to follow up with consultants as outpatient.





Continue with SCDs for DVT prophylaxis


Case management consultation to assist with discharge planning


f/u echo on monday, may need life vest if LV function depressed


BMP in am





D/W RN


D/W Dr. Fox


D/W pt


This patient was seen by myself and Dr. Fox, this note is written on his 

behalf





Problem Qualifiers





(1) STEMI (ST elevation myocardial infarction):  


Qualified Code:  I21.19 - ST elevation myocardial infarction (STEMI) involving 

other coronary artery of inferior wall


(2) Systolic CHF:  


Qualified Code:  I50.21 - Acute systolic congestive heart failure


(3) Acute kidney failure:  


Qualified Code:  N17.9 - Acute renal failure, unspecified acute renal failure 

type


(4) Hypotension:  


Qualified Code:  I95.9 - Hypotension, unspecified hypotension type


(5) Leukocytosis:  


Qualified Code:  D72.829 - Leukocytosis, unspecified type


(6) CAD (coronary artery disease):  


Qualified Code:  I25.110 - Coronary artery disease involving native coronary 

artery of native heart with unstable angina pectoris


(7) Hyperlipidemia:  


Qualified Code:  E78.5 - Hyperlipidemia, unspecified hyperlipidemia type


(8) HTN (hypertension):  


Qualified Code:  I10 - Essential hypertension


(9) DM (diabetes mellitus):  


Qualified Code:  E11.59 - Type 2 diabetes mellitus with other circulatory 

complication, without long-term current use of insulin





Glory Cabrales Jun 17, 2017 10:12

## 2017-06-18 NOTE — HHI.PR
Subjective


Subjective Remarks


no cp


no sob


depressed, " I don't see the light at the end of the tunnel"


no acute changes overnight


Tele, SR, no ectopy


ambulated yesterday, no SOB


feels overall fatigue





Review of Systems


Constitutional


Constitutional Remarks


12 point ros completed, negative except as noted above





Vitals/Results


Intake & Output











 6/17/17 6/17/17 6/18/17





 15:00 23:00 07:00


 


Intake Total  600 ml 480 ml


 


Balance  600 ml 480 ml


 


   


 


Intake Oral  600 ml 480 ml


 


# Voids  3 1








Vital Signs





 Vital Signs








  Date Time  Temp Pulse Resp B/P Pulse Ox O2 Delivery O2 Flow Rate FiO2


 


6/18/17 06:00  77      


 


6/18/17 05:00  81      


 


6/18/17 04:00  78      


 


6/18/17 03:11     96 Room Air  


 


6/18/17 03:11 99.0 86 18 118/74 92   


 


6/18/17 03:00  85      


 


6/18/17 02:00  77      


 


6/18/17 01:00  77      


 


6/18/17 00:00  75      


 


6/17/17 23:00  79      


 


6/17/17 23:00 98.7 82 18 96/49 96   


 


6/17/17 23:00     96 Room Air  


 


6/17/17 22:00  77      


 


6/17/17 21:00  86      


 


6/17/17 20:45     96   21


 


6/17/17 20:08 98.7 86 18 114/73 97   


 


6/17/17 20:08      Room Air  


 


6/17/17 20:00  85      


 


6/17/17 19:00  81      


 


6/17/17 16:00  86      


 


6/17/17 16:00 98.9 80 20 104/67 94   


 


6/17/17 15:00  85      


 


6/17/17 14:00  90      


 


6/17/17 13:00  94      


 


6/17/17 12:00  84      


 


6/17/17 12:00 99.4 95 20 112/69 96   


 


6/17/17 11:00  96      


 


6/17/17 10:30  92      


 


6/17/17 09:26     92   


 


6/17/17 09:00  84      








CBC/BMP:  


6/17/17 0453                                                                   

             6/17/17 0453








Physical Exam


General


General Appearance:  Well Developed, No Acute Distress





Eyes


Eye Exam:  Pupils Equal, Pupils Reactive





Ears & Nose


Ears & Nose Exam:  Nasal Mucosa Pink





Throat


Throat Exam:  Oral Mucosa Pink & Moist





Neck


Neck Exam:  Neck Supple





Pulmonary


Resp Exam:  Clear Bilaterally, Breath Sounds Equal





Cardiology


CV Exam:  Regular, Normal Sinus Rhythm





Gastrointestinal/Abdomen


GI Exam:  Soft, Non-Tender, Bowel Sounds Present, Non-Distended





Musculoskeletal


MS Exam:  Joints Intact





Integumentary


Skin Exam:  Warm, Dry





Extremeties


Extremities Exam:  No Edema, Pedal Pulses Palpable





Neurologic


Neuro Exam:  Alert, Awake, Oriented, Speech Clear, Moving All Extremities, No 

Focal Deficits





Psychiatric


Psych Exam:  Appropriate Responses


Psych Remarks


depressed, sad affect





VTE Prophylaxis


VTE Prophylaxis Device:  SCDs





Assessment/Plan


Problem List:  


(1) STEMI (ST elevation myocardial infarction)


(2) Systolic CHF


(3) Ischemic cardiomyopathy


(4) Acute kidney failure


(5) Hypotension


(6) Leukocytosis


(7) CAD (coronary artery disease)


(8) Hyperlipidemia


(9) HTN (hypertension)


(10) DM (diabetes mellitus)


Assessment/Plan


70-year-old male with history hypertension, diabetes, hyperlipidemia.  

Presented with chest pressure, was found positive for acute inferior wall MI.  

Patient underwent emergent cardiac catheterization per Dr. Thomas.  Patient 

had thrombectomy, angioplasty and stent of proximal, mid and distal RCA.  

Attempted but unsuccessful angioplasty of mid LAD.  He was found with severe 

left ventricular dysfunction.


-Continue with Effient, Imdur, Coreg


ACE inhibitor on hold due to renal dysfunction


-Echocardiogram has been ordered for Monday to eval LV function, if depressed, 

will need Life Vest





Acute on chronic renal injury, does have underlying disease, did receive 

contrast with cardiac catheterization.


Rhabdomyolysis


-Continue to monitor BMP


Renal ultrasound has been ordered, 6 mm stone mid pole right kidney 


Continue to hold ACE inhibitor at this time


Nephrology following patient


-creat with some improvement


-labs pending today 





Type 2 diabetes, noncompliance with taking oral hypoglycemics


pending hemoglobin A1c


Continue with Accu-Cheks before meals and at bedtime and insulin therapy





Hypertension, stable, BP 100s 


Continue home medications





Hyperlipidemia,


Continue home medications





History of bladder cancer or previous cystoscopy, has not had follow-up with 

urology


Prostate cancer, had biopsy and never was treated


-Encouraged patient to follow up with consultants as outpatient.





Depressed, verbalizes feeling hopeless about situation. 


-emotional support provided, continue to monitor.





Continue with SCDs for DVT prophylaxis


Case management consultation to assist with discharge planning


f/u echo on monday, may need life vest if LV function depressed


Labs pending today 





D/W RN


D/W Dr. Fox


D/W pt


This patient was seen by myself and Dr. Fox, this note is written on his 

behalf





Problem Qualifiers





(1) STEMI (ST elevation myocardial infarction):  


Qualified Code:  I21.19 - ST elevation myocardial infarction (STEMI) involving 

other coronary artery of inferior wall


(2) Systolic CHF:  


Qualified Code:  I50.21 - Acute systolic congestive heart failure


(3) Acute kidney failure:  


Qualified Code:  N17.9 - Acute renal failure, unspecified acute renal failure 

type


(4) Hypotension:  


Qualified Code:  I95.9 - Hypotension, unspecified hypotension type


(5) Leukocytosis:  


Qualified Code:  D72.829 - Leukocytosis, unspecified type


(6) CAD (coronary artery disease):  


Qualified Code:  I25.110 - Coronary artery disease involving native coronary 

artery of native heart with unstable angina pectoris


(7) Hyperlipidemia:  


Qualified Code:  E78.5 - Hyperlipidemia, unspecified hyperlipidemia type


(8) HTN (hypertension):  


Qualified Code:  I10 - Essential hypertension


(9) DM (diabetes mellitus):  


Qualified Code:  E11.59 - Type 2 diabetes mellitus with other circulatory 

complication, without long-term current use of insulin





Glory Cabrales Jun 18, 2017 08:42

## 2017-06-18 NOTE — HHI.NPPN
Subjective


Interval History


No labs today. 


Renal function had improved as of yesterday. 


Echo to be repeated tomorrow and based on the findings, he may get a life vest 

before discharge.





Review of Systems


General


Constitutional:  Fatigue





Objective Data


Data











 6/17/17 6/18/17





 19:00 07:00


 


Intake Total  1080 ml


 


Balance  1080 ml


 


  


 


Intake Oral  1080 ml


 


# Voids  4











 Vital Signs








  Date Time  Temp Pulse Resp B/P Pulse Ox O2 Delivery O2 Flow Rate FiO2


 


6/18/17 06:00  77      


 


6/18/17 05:00  81      


 


6/18/17 04:00  78      


 


6/18/17 03:11     96 Room Air  


 


6/18/17 03:11 99.0 86 18 118/74 92   


 


6/18/17 03:00  85      


 


6/18/17 02:00  77      


 


6/18/17 01:00  77      


 


6/18/17 00:00  75      


 


6/17/17 23:00  79      


 


6/17/17 23:00 98.7 82 18 96/49 96   


 


6/17/17 23:00     96 Room Air  


 


6/17/17 22:00  77      


 


6/17/17 21:00  86      


 


6/17/17 20:45     96   21


 


6/17/17 20:08 98.7 86 18 114/73 97   


 


6/17/17 20:08      Room Air  


 


6/17/17 20:00  85      


 


6/17/17 19:00  81      


 


6/17/17 16:00  86      


 


6/17/17 16:00 98.9 80 20 104/67 94   


 


6/17/17 15:00  85      


 


6/17/17 14:00  90      


 


6/17/17 13:00  94      


 


6/17/17 12:00  84      


 


6/17/17 12:00 99.4 95 20 112/69 96   


 


6/17/17 11:00  96      


 


6/17/17 10:30  92      


 


6/17/17 09:26     92   


 


6/17/17 09:00  84      








-:  


6/17/17 0453                                                                   

             6/17/17 0453








Physical Exam


General


Appearance:  Well Developed, No Acute Distress





Eyes


Eye Exam:  Pupils Equal, Pupils Reactive





Ears & Nose


Ears & Nose Exam:  Nasal Mucosa Pink





Throat


Throat Exam:  Oral Mucosa Pink & Moist





Neck


Neck Exam:  Neck Supple





Pulmonary


Resp Exam:  Clear Bilaterally, Breath Sounds Equal





Cardiology


CV Exam:  Regular, Normal Sinus Rhythm





Gastrointestinal/Abdomen


GI Exam:  Soft, Non-Tender, Bowel Sounds Present, Non-Distended





Musculoskeletal


MS Exam:  Joints Intact





Integumentary


Skin Exam:  Warm, Dry





Extremeties


Extremities Exam:  No Edema, Pedal Pulses Palpable





Neurologic


Neuro Exam:  Alert, Awake, Oriented, Speech Clear, Moving All Extremities, No 

Focal Deficits





Psychiatric


Psych Exam:  Appropriate Responses





VTE Prophylaxis


Device:  SCDs





Assessment/Plan


Problem List:  


(1) Acute kidney failure


Plan:  Renal function is improving. 


Monitor chemistry panel. 


Avoid nephrotoxic agents. 





(2) STEMI (ST elevation myocardial infarction)


Plan:  Status post LAD stent





(3) DM (diabetes mellitus)


Plan:  Continue to monitor blood glucose





(4) HTN (hypertension)


Plan:  BP is low normal. Once renal function stabilizes, low dose ACE inhibitor 

can be utilized. 





(5) Bladder cancer


Plan:  Follows with urology





(6) Prostate cancer


Plan:  He was taking finasteride








Problem Qualifiers





(1) Acute kidney failure:  


Qualified Code:  N17.9 - Acute renal failure, unspecified acute renal failure 

type


(2) STEMI (ST elevation myocardial infarction):  


Qualified Code:  I21.19 - ST elevation myocardial infarction (STEMI) involving 

other coronary artery of inferior wall


(3) DM (diabetes mellitus):  


Qualified Code:  E11.59 - Type 2 diabetes mellitus with other circulatory 

complication, without long-term current use of insulin


(4) HTN (hypertension):  


Qualified Code:  I10 - Essential hypertension


(5) Bladder cancer:  


Qualified Code:  C67.9 - Malignant neoplasm of urinary bladder, unspecified site





iDeter Martínez MD Jun 18, 2017 08:39

## 2017-06-19 NOTE — HHI.NPPN
Subjective


History of Present Illness


70 year old with ARF





Review of Systems


General


Constitutional:  Fatigue





Objective Data


Data











 6/18/17 6/19/17





 19:00 07:00


 


Intake Total  910 ml


 


Balance  910 ml


 


  


 


Intake Oral  910 ml


 


# Voids  6


 


# Bowel Movements  0











 Vital Signs








  Date Time  Temp Pulse Resp B/P Pulse Ox O2 Delivery O2 Flow Rate FiO2


 


6/19/17 12:27  88      


 


6/19/17 11:26 97.0 92 20 124/74 98   


 


6/19/17 11:24  92      


 


6/19/17 10:06  97      


 


6/19/17 09:28  84      


 


6/19/17 09:28 98.2 84 18 124/70    


 


6/19/17 08:16  82      


 


6/19/17 07:26  84      


 


6/19/17 07:26      Room Air  


 


6/19/17 06:00  106      


 


6/19/17 05:00  83      


 


6/19/17 04:00  82      


 


6/19/17 03:43     89   21


 


6/19/17 03:00  82      


 


6/19/17 03:00 98.6 85 18 113/75 96   


 


6/19/17 02:00  92      


 


6/19/17 01:11  80      


 


6/19/17 00:00  81      


 


6/18/17 23:00 97.8 82 18 129/74 97   


 


6/18/17 23:00  80      


 


6/18/17 22:00  82      


 


6/18/17 21:00  83      


 


6/18/17 20:30     95   21


 


6/18/17 20:00      Room Air  


 


6/18/17 20:00 98.5 83 18 112/72 97   


 


6/18/17 20:00  79      


 


6/18/17 19:00  83      


 


6/18/17 18:00  80      


 


6/18/17 17:00  84      


 


6/18/17 16:00 98.5 86 18 116/74 96   


 


6/18/17 16:00  86      


 


6/18/17 15:18     95 Nasal Cannula 2.00 


 


6/18/17 15:00  85      


 


6/18/17 14:00  82      








-:  


6/18/17 1230                                                                   

             6/18/17 1230








Physical Exam


General


Appearance:  Well Developed, No Acute Distress





Eyes


Eye Exam:  Pupils Equal, Pupils Reactive





Ears & Nose


Ears & Nose Exam:  Nasal Mucosa Pink





Throat


Throat Exam:  Oral Mucosa Pink & Moist





Neck


Neck Exam:  Neck Supple





Pulmonary


Resp Exam:  Clear Bilaterally, Breath Sounds Equal





Cardiology


CV Exam:  Regular, Normal Sinus Rhythm





Gastrointestinal/Abdomen


GI Exam:  Soft, Non-Tender, Bowel Sounds Present, Non-Distended





Musculoskeletal


MS Exam:  Joints Intact





Integumentary


Skin Exam:  Warm, Dry





Extremeties


Extremities Exam:  No Edema, Pedal Pulses Palpable





Neurologic


Neuro Exam:  Alert, Awake, Oriented, Speech Clear, Moving All Extremities, No 

Focal Deficits





Psychiatric


Psych Exam:  Appropriate Responses





VTE Prophylaxis


Device:  SCDs





Assessment/Plan


Problem List:  


(1) Acute kidney failure


Plan:  Renal function is improving. 


Monitor chemistry panel. 


Avoid nephrotoxic agents. 


Cr decreased 1.9





(2) STEMI (ST elevation myocardial infarction)


Plan:  Status post LAD stent





(3) DM (diabetes mellitus)


Plan:  Continue to monitor blood glucose





(4) HTN (hypertension)


Plan:  BP is low normal. Once renal function stabilizes, low dose ACE inhibitor 

can be utilized. 





(5) Bladder cancer


Plan:  Follows with urology





(6) Prostate cancer


Plan:  He was taking finasteride








Problem Qualifiers





(1) Acute kidney failure:  


Qualified Code:  N17.9 - Acute renal failure, unspecified acute renal failure 

type


(2) STEMI (ST elevation myocardial infarction):  


Qualified Code:  I21.19 - ST elevation myocardial infarction (STEMI) involving 

other coronary artery of inferior wall


(3) DM (diabetes mellitus):  


Qualified Code:  E11.59 - Type 2 diabetes mellitus with other circulatory 

complication, without long-term current use of insulin


(4) HTN (hypertension):  


Qualified Code:  I10 - Essential hypertension


(5) Bladder cancer:  


Qualified Code:  C67.9 - Malignant neoplasm of urinary bladder, unspecified site





Bhavya Bonilla MD Jun 19, 2017 13:05

## 2017-06-19 NOTE — PD.CARD.PN
Subjective


Subjective Remarks


No CP or SOB, still tired





Objective


Medications





 Current Medications








 Medications


  (Trade)  Dose


 Ordered  Sig/Era


 Route  Start Time


 Stop Time Status Last Admin


 


  (NS Flush)  2 ml  UNSCH  PRN


 IVF  6/14/17 22:00


    6/19/17 08:48


 


 


  (Xylocaine 2%


 Jelly)  1 applic  UNSCH  PRN


 TOP  6/15/17 00:15


     


 


 


  (Aspirin Chew)  81 mg  DAILY


 PO  6/15/17 09:00


    6/19/17 08:48


 


 


  (Effient)  10 mg  DAILY


 PO  6/15/17 09:00


    6/19/17 08:48


 


 


  (Coreg)  3.125 mg  BID


 PO  6/15/17 09:00


    6/19/17 08:47


 


 


  (Prinivil)  2.5 mg  DAILY


 PO  6/15/17 09:00


   Hold 6/15/17 08:26


 


 


  (Lipitor)  80 mg  HS


 PO  6/15/17 00:30


    6/18/17 21:13


 


 


  (Zofran Inj)  4 mg  Q6H  PRN


 IV PUSH  6/15/17 01:15


     


 


 


  (D50w (Vial) Inj)  50 ml  UNSCH  PRN


 IV  6/15/17 01:15


     


 


 


  (Glucagon Inj)  1 mg  UNSCH  PRN


 OTHER  6/15/17 01:15


     


 


 


  (Imdur)  30 mg  DAILY


 PO  6/15/17 10:30


    6/19/17 08:48


 


 


  (Pill Splitter)  1 ea  UNSCH  PRN


 OTHER  6/15/17 10:30


     


 


 


  (Prinivil)  2.5 mg  DAILY


 PO  6/16/17 09:00


   Hold 6/16/17 07:41


 


 


  (Dulcolax Ec)  10 mg  DAILY  PRN


 PO  6/18/17 15:30


     


 


 


  (Colace)  100 mg  BID


 PO  6/18/17 21:00


    6/19/17 08:48


 








Vital Signs / I&O





 Vital Signs








  Date Time  Temp Pulse Resp B/P Pulse Ox O2 Delivery O2 Flow Rate FiO2


 


6/19/17 16:19  80      


 


6/19/17 15:12 97.2 58 18 126/74 98   


 


6/19/17 15:10  84      


 


6/19/17 14:06  81      


 


6/19/17 13:45  88      


 


6/19/17 12:27  88      


 


6/19/17 11:26 97.0 92 20 124/74 98   


 


6/19/17 11:24  92      


 


6/19/17 10:06  97      


 


6/19/17 09:28  84      


 


6/19/17 09:28 98.2 84 18 124/70    


 


6/19/17 08:16  82      


 


6/19/17 07:26  84      


 


6/19/17 07:26      Room Air  


 


6/19/17 06:00  106      


 


6/19/17 05:00  83      


 


6/19/17 04:00  82      


 


6/19/17 03:43     89   21


 


6/19/17 03:00  82      


 


6/19/17 03:00 98.6 85 18 113/75 96   


 


6/19/17 02:00  92      


 


6/19/17 01:11  80      


 


6/19/17 00:00  81      


 


6/18/17 23:00 97.8 82 18 129/74 97   


 


6/18/17 23:00  80      


 


6/18/17 22:00  82      


 


6/18/17 21:00  83      


 


6/18/17 20:30     95   21


 


6/18/17 20:00      Room Air  


 


6/18/17 20:00 98.5 83 18 112/72 97   


 


6/18/17 20:00  79      


 


6/18/17 19:00  83      


 


6/18/17 18:00  80      


 


6/18/17 17:00  84      








 I/O








 6/18/17 6/18/17 6/18/17 6/19/17 6/19/17 6/19/17





 07:00 15:00 23:00 07:00 15:00 23:00


 


Intake Total 480 ml  600 ml 310 ml  


 


Balance 480 ml  600 ml 310 ml  


 


      


 


Intake Oral 480 ml  600 ml 310 ml  


 


# Voids 1  4 2  


 


# Bowel Movements    0  








Physical Exam


GENERAL: In NAD


SKIN: Warm and dry.


HEAD: Normocephalic.


EYES: No scleral icterus. No injection or drainage. 


NECK: Supple, trachea midline. No JVD or lymphadenopathy.


CARDIOVASCULAR: Regular rate and rhythm without murmurs, gallops, or rubs. 


RESPIRATORY: Breath sounds equal bilaterally. No accessory muscle use.


GASTROINTESTINAL: Abdomen soft, non-tender, nondistended. 


MUSCULOSKELETAL: No cyanosis, or edema. 


Groin stable, no hematoma





Laboratory





 Laboratory Tests








Test 6/15/17 6/15/17 6/16/17 6/17/17





 02:31 20:30 04:06 04:53


 


Prothrombin Time 10.8 SEC   


 


Prothromb Time International 1.0 RATIO   





Ratio    


 


Magnesium Level 2.0 MG/DL   


 


Total Bilirubin 0.7 MG/DL   


 


Aspartate Amino Transf 675 U/L   





(AST/SGOT)    


 


Alanine Aminotransferase 95 U/L   





(ALT/SGPT)    


 


Alkaline Phosphatase 114 U/L   


 


Total Protein 7.0 GM/DL   


 


Albumin 3.6 GM/DL   


 


Activated Partial  24.3 SEC  





Thromboplast Time    


 


Triglycerides Level   159 MG/DL 


 


Cholesterol Level   204 MG/DL 


 


LDL Cholesterol   131 MG/DL 


 


HDL Cholesterol   41.5 MG/DL 


 


Cholesterol/HDL Ratio   4.91 RATIO 


 


Phosphorus Level    3.3 MG/DL


 


Hemoglobin A1c    7.1 %


 


Total Creatine Kinase    781 U/L


 


Creatine Kinase MB    16.7 NG/ML


 


Creatine Kinase MB %    2.1 %


 


    





Test 6/18/17   





 12:30   


 


White Blood Count 9.7 TH/MM3   


 


Red Blood Count 3.99 MIL/MM3   


 


Hemoglobin 11.9 GM/DL   


 


Hematocrit 36.1 %   


 


Mean Corpuscular Volume 90.4 FL   


 


Mean Corpuscular Hemoglobin 29.8 PG   


 


Mean Corpuscular Hemoglobin 32.9 %   





Concent    


 


Red Cell Distribution Width 12.9 %   


 


Platelet Count 140 TH/MM3   


 


Mean Platelet Volume 9.8 FL   


 


Neutrophils (%) (Auto) 81.4 %   


 


Lymphocytes (%) (Auto) 9.5 %   


 


Monocytes (%) (Auto) 6.5 %   


 


Eosinophils (%) (Auto) 2.2 %   


 


Basophils (%) (Auto) 0.4 %   


 


Neutrophils # (Auto) 7.9 TH/MM3   


 


Lymphocytes # (Auto) 0.9 TH/MM3   


 


Monocytes # (Auto) 0.6 TH/MM3   


 


Eosinophils # (Auto) 0.2 TH/MM3   


 


Basophils # (Auto) 0.0 TH/MM3   


 


CBC Comment DIFF FINAL    


 


Differential Comment     


 


Sodium Level 139 MEQ/L   


 


Potassium Level 3.7 MEQ/L   


 


Chloride Level 105 MEQ/L   


 


Carbon Dioxide Level 26.9 MEQ/L   


 


Anion Gap 7 MEQ/L   


 


Blood Urea Nitrogen 44 MG/DL   


 


Creatinine 1.95 MG/DL   


 


Estimat Glomerular Filtration 34 ML/MIN   





Rate    


 


Random Glucose 225 MG/DL   


 


Calcium Level 8.9 MG/DL   








Imaging





Last Impressions








Renal Ultrasound 6/16/17 0000 Signed





Impressions: 





 Service Date/Time:  Friday, June 16, 2017 17:00 - CONCLUSION:  1. 6 mm stone 

in 





 the midpole collecting system on the right. 2. Kidneys are otherwise 





 unremarkable in appearance with no evidence of obstruction.     Jeffry García MD 


 


Chest X-Ray 6/16/17 0000 Signed





Impressions: 





 Service Date/Time:  Friday, June 16, 2017 09:38 - CONCLUSION:  1. Significant 





 interval improvement in the pulmonary parenchyma compared to previous dated 





 6/15/17.     Jeffry García MD 











Assessment and Plan


Problem List:  


(1) STEMI (ST elevation myocardial infarction)


(2) CAD (coronary artery disease)


(3) Systolic CHF


(4) Ischemic cardiomyopathy


(5) DM (diabetes mellitus)


(6) HTN (hypertension)


(7) Hyperlipidemia


(8) Renal insufficiency


Assessment and Plan


Stable post MI and RCA PCI. Continue post MI care. Monitor on telemetry. Groin 

remains stable. No arrhythmias. Continue platelet inhibition with Effient and 

baby ASA. Continue low dose beta blocker, high dose statin statin, and Imdur; 

restart ACE inhibitor. Monitor renal fx. Renal US c/w nephrolithiasis. Increase 

activity. Echo shows significant improvement of LV function. Home possibly 

tomorrow if stable.





Problem Qualifiers





(1) STEMI (ST elevation myocardial infarction):  


Qualified Code:  I21.19 - ST elevation myocardial infarction (STEMI) involving 

other coronary artery of inferior wall


(2) CAD (coronary artery disease):  


Qualified Code:  I25.110 - Coronary artery disease involving native coronary 

artery of native heart with unstable angina pectoris


(3) Systolic CHF:  


Qualified Code:  I50.21 - Acute systolic congestive heart failure


(4) DM (diabetes mellitus):  


Qualified Code:  E11.59 - Type 2 diabetes mellitus with other circulatory 

complication, without long-term current use of insulin


(5) HTN (hypertension):  


Qualified Code:  I10 - Essential hypertension


(6) Hyperlipidemia:  


Qualified Code:  E78.5 - Hyperlipidemia, unspecified hyperlipidemia type





Sara Thomas MD Jun 19, 2017 17:01

## 2017-06-19 NOTE — HHI.PR
Subjective


Interval History


no cp


no sob


feeling tired on walking no sob 


no acute changes overnight


Tele, SR, no ectopy


ambulating , no SOB


feels overall fatigue





Review of Systems


12 point ros completed, negative except as noted above





Vitals/Results


Intake & Output











 6/18/17 6/18/17 6/19/17





 15:00 23:00 07:00


 


Intake Total  600 ml 310 ml


 


Balance  600 ml 310 ml


 


   


 


Intake Oral  600 ml 310 ml


 


# Voids  4 2


 


# Bowel Movements   0








Vital Signs





 Vital Signs








  Date Time  Temp Pulse Resp B/P Pulse Ox O2 Delivery O2 Flow Rate FiO2


 


6/19/17 12:27  88      


 


6/19/17 11:26 97.0 92 20 124/74 98   


 


6/19/17 11:24  92      


 


6/19/17 10:06  97      


 


6/19/17 09:28  84      


 


6/19/17 09:28 98.2 84 18 124/70    


 


6/19/17 08:16  82      


 


6/19/17 07:26  84      


 


6/19/17 07:26      Room Air  


 


6/19/17 06:00  106      


 


6/19/17 05:00  83      


 


6/19/17 04:00  82      


 


6/19/17 03:43     89   21


 


6/19/17 03:00  82      


 


6/19/17 03:00 98.6 85 18 113/75 96   


 


6/19/17 02:00  92      


 


6/19/17 01:11  80      


 


6/19/17 00:00  81      


 


6/18/17 23:00 97.8 82 18 129/74 97   


 


6/18/17 23:00  80      


 


6/18/17 22:00  82      


 


6/18/17 21:00  83      


 


6/18/17 20:30     95   21


 


6/18/17 20:00      Room Air  


 


6/18/17 20:00 98.5 83 18 112/72 97   


 


6/18/17 20:00  79      


 


6/18/17 19:00  83      


 


6/18/17 18:00  80      


 


6/18/17 17:00  84      


 


6/18/17 16:00 98.5 86 18 116/74 96   


 


6/18/17 16:00  86      


 


6/18/17 15:18     95 Nasal Cannula 2.00 


 


6/18/17 15:00  85      


 


6/18/17 14:00  82      


 


6/18/17 13:00  86      








CBC/BMP:  


6/18/17 1230                                                                   

             6/18/17 1230








Physical Exam


General


General Appearance:  Well Developed, No Acute Distress





Eyes


Eye Exam:  Pupils Equal, Pupils Reactive





Ears & Nose


Ears & Nose Exam:  Nasal Mucosa Pink





Throat


Throat Exam:  Oral Mucosa Pink & Moist





Neck


Neck Exam:  Neck Supple





Pulmonary


Resp Exam:  Clear Bilaterally, Breath Sounds Equal





Cardiology


CV Exam:  Regular, Normal Sinus Rhythm





Gastrointestinal/Abdomen


GI Exam:  Soft, Non-Tender, Bowel Sounds Present, Non-Distended





Musculoskeletal


MS Exam:  Joints Intact





Integumentary


Skin Exam:  Warm, Dry





Extremeties


Extremities Exam:  No Edema, Pedal Pulses Palpable





Neurologic


Neuro Exam:  Alert, Awake, Oriented, Speech Clear, Moving All Extremities, No 

Focal Deficits





Psychiatric


Psych Exam:  Appropriate Responses





VTE Prophylaxis


VTE Prophylaxis Device:  SCDs





Assessment/Plan


Problem List:  


(1) STEMI (ST elevation myocardial infarction)


(2) Systolic CHF


(3) Ischemic cardiomyopathy


(4) Acute kidney failure


(5) Hypotension


(6) Leukocytosis


(7) CAD (coronary artery disease)


(8) Hyperlipidemia


(9) HTN (hypertension)


(10) DM (diabetes mellitus)


Assessment/Plan


70-year-old male with history hypertension, diabetes, hyperlipidemia.  

Presented with chest pressure, was found positive for acute inferior wall MI.  

Patient underwent emergent cardiac catheterization per Dr. Thomas.  Patient 

had thrombectomy, angioplasty and stent of proximal, mid and distal RCA.  

Attempted but unsuccessful angioplasty of mid LAD.  He was found with severe 

left ventricular dysfunction.


-Continue with Effient, Imdur, Coreg


ACE inhibitor on hold due to renal dysfunction


-Echocardiogram has been ordered for Monday to eval LV function, if depressed, 

will need Life Vest





Acute on chronic renal injury, does have underlying disease, did receive 

contrast with cardiac catheterization.


Rhabdomyolysis


-Continue to monitor BMP


Renal ultrasound has been ordered, 6 mm stone mid pole right kidney 


Continue to hold ACE inhibitor at this time


Nephrology following patient, naz input 


-creat with some improvement,likely now at baseline 


-labs reviewed  





Type 2 diabetes, noncompliance with taking oral hypoglycemics


pending hemoglobin A1c


Continue with Accu-Cheks before meals and at bedtime and insulin therapy





Hypertension, stable, BP 100s 


Continue home medications





Hyperlipidemia,


Continue home medications





History of bladder cancer or previous cystoscopy, has not had follow-up with 

urology


Prostate cancer, had biopsy and never was treated


-Encouraged patient to follow up with consultants as outpatient.





Depressed, now walking around. 


-emotional support provided, continue to monitor.





Continue with SCDs for DVT prophylaxis


Case management consultation to assist with discharge planning


f/u echo on monday, may need life vest if LV function depressed


Labs for tomorrow 





D/W RN





Problem Qualifiers





(1) STEMI (ST elevation myocardial infarction):  


Qualified Code:  I21.19 - ST elevation myocardial infarction (STEMI) involving 

other coronary artery of inferior wall


(2) Systolic CHF:  


Qualified Code:  I50.21 - Acute systolic congestive heart failure


(3) Acute kidney failure:  


Qualified Code:  N17.9 - Acute renal failure, unspecified acute renal failure 

type


(4) Hypotension:  


Qualified Code:  I95.9 - Hypotension, unspecified hypotension type


(5) Leukocytosis:  


Qualified Code:  D72.829 - Leukocytosis, unspecified type


(6) CAD (coronary artery disease):  


Qualified Code:  I25.110 - Coronary artery disease involving native coronary 

artery of native heart with unstable angina pectoris


(7) Hyperlipidemia:  


Qualified Code:  E78.5 - Hyperlipidemia, unspecified hyperlipidemia type


(8) HTN (hypertension):  


Qualified Code:  I10 - Essential hypertension


(9) DM (diabetes mellitus):  


Qualified Code:  E11.59 - Type 2 diabetes mellitus with other circulatory 

complication, without long-term current use of insulin





Tre Lagos MD Jun 19, 2017 12:47

## 2017-06-19 NOTE — ECHRPT
Indication:   MI, CM

 

 CONCLUSIONS

 Normal left ventricular size. 

 Wall thickness is normal. 

 The left ventricular systolic function is moderate-to-severly reduced with an estimated ejection fra
ction in 

 the range of 35-40%. 

 There is diffuse global hypokinesis with distinct regional wall motion abnormalities. 

 Mild mitral valve regurgitation. 

 Aortic valve sclerosis is present. 

 There is mild to moderate tricuspid valve regurgitation. 

 There is estimated moderate-to-severe pulmonary hypertension present (69 mmHg). 

 There is a trivial pericardial effusion present. 

 

 BP:        /         HR:                          Rhythm:           Sinus

 

 MEASUREMENTS  (Male / Female) Normal Values       Technical Quality:Fair

 2D ECHO

 LV Diastolic Diameter PLAX        5.5 cm                4.2 - 5.9 / 3.9 - 5.3 cm

 LV Systolic Diameter PLAX         4.8 cm                

 IVS Diastolic Thickness           0.9 cm                0.6 - 1.0 / 0.6 - 0.9 cm

 LVPW Diastolic Thickness          0.9 cm                0.6 - 1.0 / 0.6 - 0.9 cm

 LV Relative Wall Thickness        0.3                   

 RV Internal Dim ED PLAX           2.1 cm                

 LA Systolic Diameter LX           4.1 cm                3.0 - 4.0 / 2.7 - 3.8 cm

 

 DOPPLER

 MR Peak Velocity                  434.0 cm/s            

 MR Peak Gradient                  75.3 mmHg             

 Mitral E Point Velocity           87.9 cm/s             

 Mitral A Point Velocity           70.1 cm/s             

 Mitral E to A Ratio               1.3                   

 TR Peak Velocity                  384.0 cm/s            

 TR Peak Gradient                  59.0 mmHg             

 

 

 FINDINGS

 

 LEFT VENTRICLE

 Normal left ventricular size. 

 Wall thickness is normal. 

 The left ventricular systolic function is moderate-to-severly reduced with an estimated ejection fra
ction in 

 the range of 35-40%. 

 There is diffuse global hypokinesis with distinct regional wall motion abnormalities. 

 

 RIGHT VENTRICLE

 Normal right ventricular size and systolic function.  

 

 LEFT ATRIUM

 The left atrial size is normal.  

 

 RIGHT ATRIUM

 The right atrial size is normal.  

 

 ATRIAL SEPTUM

 Normal atrial septal thickness without atrial level shunting by limited color doppler interrogation.
  

 

 AORTA

 The aortic root and proximal ascending aorta are normal in size on limited imaging.  

 

 MITRAL VALVE

 Mild mitral valve regurgitation. 

 

 AORTIC VALVE

 Aortic valve sclerosis is present. 

 

 TRICUSPID VALVE

 There is mild to moderate tricuspid valve regurgitation. 

 There is estimated moderate-to-severe pulmonary hypertension present (69 mmHg). 

 

 PULMONARY VALVE

 The pulmonary valve is not well visualized.  

 

 VESSELS

 The inferior vena cava is normal in size.  

 

 PERICARDIUM

 There is a trivial pericardial effusion present. 

 

 

 

 

  Calvin Gandhi MD

  (Electronically Signed)

  Final Date:19 June 2017 14:22

## 2017-06-20 NOTE — HHI.PR
Subjective


Interval History


Patient is walking around without any problem


No chest pain


No shortness of breath


No nausea vomiting


Review of system for 10 point system otherwise unremarkable





Vitals/Results


Intake & Output











 6/19/17 6/19/17 6/20/17





 15:00 23:00 07:00


 


Intake Total  1000 ml 480 ml


 


Balance  1000 ml 480 ml


 


   


 


Intake Oral  1000 ml 480 ml


 


IV Total  0 ml 


 


# Voids  3 2








Vital Signs





 Vital Signs








  Date Time  Temp Pulse Resp B/P Pulse Ox O2 Delivery O2 Flow Rate FiO2


 


6/20/17 13:00  80      


 


6/20/17 12:16  79      


 


6/20/17 11:09  72      


 


6/20/17 11:00 97.8 80 17 109/68 96   


 


6/20/17 10:18  76      


 


6/20/17 09:50   20     


 


6/20/17 09:00  98      


 


6/20/17 08:00  104      


 


6/20/17 07:46 98.5 83 19 117/72 97   


 


6/20/17 07:00  82      


 


6/20/17 06:00  75      


 


6/20/17 05:00  78      


 


6/20/17 04:00  77      


 


6/20/17 03:00  78      


 


6/20/17 03:00 98.1 84 18 108/70 97   


 


6/20/17 02:07  78      


 


6/20/17 01:00  78      


 


6/20/17 00:00  80      


 


6/19/17 23:00  76      


 


6/19/17 23:00 98.6 80 18 100/63 94   


 


6/19/17 22:00  84      


 


6/19/17 21:00  80      


 


6/19/17 20:00  84      


 


6/19/17 19:00 98.8 87 18 105/65 95   


 


6/19/17 19:00     95 Room Air  


 


6/19/17 19:00  94      


 


6/19/17 18:05  84      


 


6/19/17 17:15  80      


 


6/19/17 16:19  80      


 


6/19/17 15:12 97.2 58 18 126/74 98   


 


6/19/17 15:10  84      


 


6/19/17 14:06  81      


 


6/19/17 13:45  88      








CBC/BMP:  


6/18/17 1230                                                                   

             6/20/17 0442





Lab Results





Laboratory Tests








Test 6/20/17





 04:42


 


Sodium Level 141 MEQ/L


 


Potassium Level 3.7 MEQ/L


 


Chloride Level 107 MEQ/L


 


Carbon Dioxide Level 23.9 MEQ/L


 


Anion Gap 10 MEQ/L


 


Blood Urea Nitrogen 41 MG/DL


 


Creatinine 1.88 MG/DL


 


Estimat Glomerular Filtration 36 ML/MIN





Rate 


 


Random Glucose 166 MG/DL


 


Calcium Level 8.9 MG/DL











Physical Exam


General


General Appearance:  Well Developed, No Acute Distress





Eyes


Eye Exam:  Pupils Equal, Pupils Reactive





Ears & Nose


Ears & Nose Exam:  Nasal Mucosa Pink





Throat


Throat Exam:  Oral Mucosa Pink & Moist





Neck


Neck Exam:  Neck Supple





Pulmonary


Resp Exam:  Clear Bilaterally, Breath Sounds Equal





Cardiology


CV Exam:  Regular, Normal Sinus Rhythm





Gastrointestinal/Abdomen


GI Exam:  Soft, Non-Tender, Bowel Sounds Present, Non-Distended





Musculoskeletal


MS Exam:  Joints Intact





Integumentary


Skin Exam:  Warm, Dry





Extremeties


Extremities Exam:  No Edema, Pedal Pulses Palpable





Neurologic


Neuro Exam:  Alert, Awake, Oriented, Speech Clear, Moving All Extremities, No 

Focal Deficits





Psychiatric


Psych Exam:  Appropriate Responses





VTE Prophylaxis


VTE Prophylaxis Device:  SCDs





Assessment/Plan


Problem List:  


(1) STEMI (ST elevation myocardial infarction)


(2) Systolic CHF


(3) Ischemic cardiomyopathy


(4) Acute kidney failure


(5) Hypotension


(6) Leukocytosis


(7) CAD (coronary artery disease)


(8) Hyperlipidemia


(9) HTN (hypertension)


(10) DM (diabetes mellitus)


Assessment/Plan


70-year-old male with history hypertension, diabetes, hyperlipidemia.  

Presented with chest pressure, was found positive for acute inferior wall MI.  

Patient underwent emergent cardiac catheterization per Dr. Thomas.  Patient 

had thrombectomy, angioplasty and stent of proximal, mid and distal RCA.  

Attempted but unsuccessful angioplasty of mid LAD.  He was found with severe 

left ventricular dysfunction.


-Continue with Effient, Imdur, Coreg


ACE inhibitor, low-dose started by cardiology and he will follow renal 

function as outpatient.  Discussed with him today


-Echocardiogram showing EF of 35-40%.  Also pulmonary hypertension.





Acute on chronic renal injury, does have underlying disease, did receive 

contrast with cardiac catheterization.


Rhabdomyolysis


-Labs reviewed


Renal ultrasound has been ordered, 6 mm stone mid pole right kidney 


Continue ACE inhibitor at this time, low-dose


Nephrology following patient, naz input 


-creat with some improvement,likely now at baseline 


-labs reviewed  





Type 2 diabetes, noncompliance with taking oral hypoglycemics


pending hemoglobin A1c


Continue with Accu-Cheks before meals and at bedtime and insulin therapy





Hypertension, stable, BP


Continue home medications





Hyperlipidemia,


Continue home medications





History of bladder cancer or previous cystoscopy, has not had follow-up with 

urology


Prostate cancer, had biopsy and never was treated


-Encouraged patient to follow up with consultants as outpatient.





Depressed, now walking around. 


-emotional support provided, continue to monitor.





Continue with SCDs for DVT prophylaxis


Discussed with Dr. Thomas about discharge planning


DC home today


Meds reviewed


Follow-up explained to the patient.





D/W RN


Discussed with patient





Problem Qualifiers





(1) STEMI (ST elevation myocardial infarction):  


Qualified Code:  I21.19 - ST elevation myocardial infarction (STEMI) involving 

other coronary artery of inferior wall


(2) Systolic CHF:  


Qualified Code:  I50.21 - Acute systolic congestive heart failure


(3) Acute kidney failure:  


Qualified Code:  N17.9 - Acute renal failure, unspecified acute renal failure 

type


(4) Hypotension:  


Qualified Code:  I95.9 - Hypotension, unspecified hypotension type


(5) Leukocytosis:  


Qualified Code:  D72.829 - Leukocytosis, unspecified type


(6) CAD (coronary artery disease):  


Qualified Code:  I25.110 - Coronary artery disease involving native coronary 

artery of native heart with unstable angina pectoris


(7) Hyperlipidemia:  


Qualified Code:  E78.5 - Hyperlipidemia, unspecified hyperlipidemia type


(8) HTN (hypertension):  


Qualified Code:  I10 - Essential hypertension


(9) DM (diabetes mellitus):  


Qualified Code:  E11.59 - Type 2 diabetes mellitus with other circulatory 

complication, without long-term current use of insulin





Tre Lagos MD Jun 20, 2017 13:24

## 2017-06-20 NOTE — PD.CARD.PN
Subjective


Subjective Remarks


No CP or SOB, feels better





Objective


Medications





 Current Medications








 Medications


  (Trade)  Dose


 Ordered  Sig/Era


 Route  Start Time


 Stop Time Status Last Admin


 


  (NS Flush)  2 ml  UNSCH  PRN


 IVF  6/14/17 22:00


    6/19/17 08:48


 


 


  (Xylocaine 2%


 Jelly)  1 applic  UNSCH  PRN


 TOP  6/15/17 00:15


     


 


 


  (Aspirin Chew)  81 mg  DAILY


 PO  6/15/17 09:00


    6/20/17 08:32


 


 


  (Effient)  10 mg  DAILY


 PO  6/15/17 09:00


    6/20/17 08:32


 


 


  (Coreg)  3.125 mg  BID


 PO  6/15/17 09:00


    6/20/17 08:32


 


 


  (Lipitor)  80 mg  HS


 PO  6/15/17 00:30


    6/19/17 21:06


 


 


  (Zofran Inj)  4 mg  Q6H  PRN


 IV PUSH  6/15/17 01:15


     


 


 


  (D50w (Vial) Inj)  50 ml  UNSCH  PRN


 IV  6/15/17 01:15


     


 


 


  (Glucagon Inj)  1 mg  UNSCH  PRN


 OTHER  6/15/17 01:15


     


 


 


  (Imdur)  30 mg  DAILY


 PO  6/15/17 10:30


    6/20/17 08:31


 


 


  (Pill Splitter)  1 ea  UNSCH  PRN


 OTHER  6/15/17 10:30


     


 


 


  (Dulcolax Ec)  10 mg  DAILY  PRN


 PO  6/18/17 15:30


     


 


 


  (Colace)  100 mg  BID


 PO  6/18/17 21:00


    6/20/17 08:31


 


 


  (Prinivil)  2.5 mg  DAILY


 PO  6/19/17 17:30


    6/20/17 08:31


 


 


  (Tylenol)  650 mg  Q6H  PRN


 PO  6/20/17 00:45


    6/20/17 08:32


 








Vital Signs / I&O





 Vital Signs








  Date Time  Temp Pulse Resp B/P Pulse Ox O2 Delivery O2 Flow Rate FiO2


 


6/20/17 14:00  72      


 


6/20/17 13:00  80      


 


6/20/17 12:16  79      


 


6/20/17 11:09  72      


 


6/20/17 11:00 97.8 80 17 109/68 96   


 


6/20/17 10:18  76      


 


6/20/17 09:50   20     


 


6/20/17 09:00  98      


 


6/20/17 08:00  104      


 


6/20/17 07:46 98.5 83 19 117/72 97   


 


6/20/17 07:00  82      


 


6/20/17 06:00  75      


 


6/20/17 05:00  78      


 


6/20/17 04:00  77      


 


6/20/17 03:00  78      


 


6/20/17 03:00 98.1 84 18 108/70 97   


 


6/20/17 02:07  78      


 


6/20/17 01:00  78      


 


6/20/17 00:00  80      


 


6/19/17 23:00  76      


 


6/19/17 23:00 98.6 80 18 100/63 94   


 


6/19/17 22:00  84      


 


6/19/17 21:00  80      


 


6/19/17 20:00  84      


 


6/19/17 19:00 98.8 87 18 105/65 95   


 


6/19/17 19:00     95 Room Air  


 


6/19/17 19:00  94      


 


6/19/17 18:05  84      


 


6/19/17 17:15  80      


 


6/19/17 16:19  80      


 


6/19/17 15:12 97.2 58 18 126/74 98   


 


6/19/17 15:10  84      








 I/O








 6/19/17 6/19/17 6/19/17 6/20/17 6/20/17 6/20/17





 07:00 15:00 23:00 07:00 15:00 23:00


 


Intake Total 310 ml  1000 ml 480 ml  


 


Balance 310 ml  1000 ml 480 ml  


 


      


 


Intake Oral 310 ml  1000 ml 480 ml  


 


IV Total   0 ml   


 


# Voids 2  3 2  


 


# Bowel Movements 0     








Physical Exam


GENERAL: In NAD


SKIN: Warm and dry.


HEAD: Normocephalic.


EYES: No scleral icterus. No injection or drainage. 


NECK: Supple, trachea midline. No JVD or lymphadenopathy.


CARDIOVASCULAR: Regular rate and rhythm without murmurs, gallops, or rubs. 


RESPIRATORY: Breath sounds equal bilaterally. No accessory muscle use.


GASTROINTESTINAL: Abdomen soft, non-tender, nondistended. 


MUSCULOSKELETAL: No cyanosis, or edema. 


Groin stable





Laboratory





Laboratory Tests








Test 6/20/17





 04:42


 


Sodium Level 141 MEQ/L


 


Potassium Level 3.7 MEQ/L


 


Chloride Level 107 MEQ/L


 


Carbon Dioxide Level 23.9 MEQ/L


 


Anion Gap 10 MEQ/L


 


Blood Urea Nitrogen 41 MG/DL


 


Creatinine 1.88 MG/DL


 


Estimat Glomerular Filtration 36 ML/MIN





Rate 


 


Random Glucose 166 MG/DL


 


Calcium Level 8.9 MG/DL








Imaging





Last Impressions








Renal Ultrasound 6/16/17 0000 Signed





Impressions: 





 Service Date/Time:  Friday, June 16, 2017 17:00 - CONCLUSION:  1. 6 mm stone 

in 





 the midpole collecting system on the right. 2. Kidneys are otherwise 





 unremarkable in appearance with no evidence of obstruction.     Jeffry García MD 


 


Chest X-Ray 6/16/17 0000 Signed





Impressions: 





 Service Date/Time:  Friday, June 16, 2017 09:38 - CONCLUSION:  1. Significant 





 interval improvement in the pulmonary parenchyma compared to previous dated 





 6/15/17.     Jeffry García MD 











Assessment and Plan


Problem List:  


(1) STEMI (ST elevation myocardial infarction)


(2) CAD (coronary artery disease)


(3) Systolic CHF


(4) Ischemic cardiomyopathy


(5) DM (diabetes mellitus)


(6) HTN (hypertension)


(7) Hyperlipidemia


(8) Renal insufficiency


Assessment and Plan


Remains stable post MI and RCA PCI. Continue post MI care. Monitor on 

telemetry. Groin remains stable. No arrhythmias. Continue platelet inhibition 

with Effient and baby ASA. Continue low dose beta blocker, high dose statin 

statin, and Imdur; restarted ACE inhibitor. Monitor renal fx, f/u w nephrology. 

Renal US c/w nephrolithiasis. Increase activity. Echo shows significant 

improvement of LV function. Home today, will schedule outpt f/u. Referred to 

card rehab.





Problem Qualifiers





(1) STEMI (ST elevation myocardial infarction):  


Qualified Code:  I21.19 - ST elevation myocardial infarction (STEMI) involving 

other coronary artery of inferior wall


(2) CAD (coronary artery disease):  


Qualified Code:  I25.110 - Coronary artery disease involving native coronary 

artery of native heart with unstable angina pectoris


(3) Systolic CHF:  


Qualified Code:  I50.21 - Acute systolic congestive heart failure


(4) DM (diabetes mellitus):  


Qualified Code:  E11.59 - Type 2 diabetes mellitus with other circulatory 

complication, without long-term current use of insulin


(5) HTN (hypertension):  


Qualified Code:  I10 - Essential hypertension


(6) Hyperlipidemia:  


Qualified Code:  E78.5 - Hyperlipidemia, unspecified hyperlipidemia type





Sara Thomas MD Jun 20, 2017 14:42

## 2017-06-20 NOTE — HHI.DS
Discharge Summary


Admission Date


Jun 14, 2017 at 21:55


Discharge Date:  Jun 20, 2017


Admitting Diagnosis


STEMI ALERT





(1) STEMI (ST elevation myocardial infarction)


Diagnosis:  Principal





(2) Systolic CHF


Diagnosis:  Principal





(3) CAD (coronary artery disease)


Diagnosis:  Principal





(4) Hyperlipidemia


Diagnosis:  Secondary





(5) HTN (hypertension)


Diagnosis:  Secondary





(6) DM (diabetes mellitus)


Diagnosis:  Secondary





(7) Ischemic cardiomyopathy


Diagnosis:  Secondary





(8) Renal insufficiency


Diagnosis:  Principal





(9) Acute kidney failure


Diagnosis:  Principal





(10) Leukocytosis


Diagnosis:  Principal





(11) Hypotension


Diagnosis:  Secondary





(12) Bladder cancer


Diagnosis:  Secondary





(13) Prostate cancer


Diagnosis:  Secondary





Brief History


This was a 70-year-old male with significant past medical history hypertension, 

hyperlipidemia, type 2 diabetes.  Patient presented to the emergency room on 6/ 14/2017 with complaint of retrosternal chest pain 7 out of 10 which started 

shortly after eating dinner, about 90 minutes prior to evaluation in ED.  

Patient presented to the emergency room was evaluated.  EKG obtained revealed 

an acute inferior wall MI.  Patient underwent emergent cardiac catheterization 

per Dr. Thomas.  Patient had thrombectomy, angioplasty and stent of proximal, 

mid and distal RCA.  Attempted but unsuccessful angioplasty of mid LAD.  He was 

found with severe left ventricular dysfunction.  Patient was admitted under 

intensivist services to the intensive care unit for closer monitoring.  Patient 

had been put on Effient, aspirin and Imdur.  He was also found with acute renal 

injury, ACE inhibitor as are currently on hold.  He did have an episode of 

hypotension which responded to fluid bolus.  Patient endorsed history of renal 

insufficiency, creatinine is around 1.65.  Stated that his blood sugars have 

not been well managed at home and sometimes he doesn't take his oral 

hypoglycemics.  Chest x-ray shows some CHF and he has received Lasix.  patient 

denied any chest pain, he does have some fatigue.  Denied any shortness of 

breath.  He was somewhat anxious about discharge and been able to afford 

medications.  Hospitalist services are requested to assume medical management.


CBC/BMP:  


6/18/17 1230                                                                   

             6/20/17 0442





Significant Findings





Laboratory Tests








Test 6/18/17 6/20/17





 12:30 04:42


 


Red Blood Count 3.99 MIL/MM3 





 (4.50-5.90) 


 


Hemoglobin 11.9 GM/DL 





 (13.0-17.0) 


 


Hematocrit 36.1 % 





 (39.0-51.0) 


 


Platelet Count 140 TH/MM3 





 (150-450) 


 


Neutrophils (%) (Auto) 81.4 % 





 (16.0-70.0) 


 


Neutrophils # (Auto) 7.9 TH/MM3 





 (1.8-7.7) 


 


Lymphocytes # (Auto) 0.9 TH/MM3 





 (1.0-4.8) 


 


Blood Urea Nitrogen 44 MG/DL (7-18) 41 MG/DL (7-18)


 


Creatinine 1.95 MG/DL 1.88 MG/DL





 (0.60-1.30) (0.60-1.30)


 


Estimat Glomerular Filtration 34 ML/MIN (>89) 36 ML/MIN (>89)





Rate  


 


Random Glucose 225 MG/ MG/DL





 () ()








Imaging





Last Impressions








Renal Ultrasound 6/16/17 0000 Signed





Impressions: 





 Service Date/Time:  Friday, June 16, 2017 17:00 - CONCLUSION:  1. 6 mm stone 

in 





 the midpole collecting system on the right. 2. Kidneys are otherwise 





 unremarkable in appearance with no evidence of obstruction.     Jeffry García MD 


 


Chest X-Ray 6/16/17 0000 Signed





Impressions: 





 Service Date/Time:  Friday, June 16, 2017 09:38 - CONCLUSION:  1. Significant 





 interval improvement in the pulmonary parenchyma compared to previous dated 





 6/15/17.     Jeffry García MD 








PE at Discharge


General Appearance:  Well Developed, No Acute Distress





Eyes


Eye Exam:  Pupils Equal, Pupils Reactive





Ears & Nose


Ears & Nose Exam:  Nasal Mucosa Pink





Throat


Throat Exam:  Oral Mucosa Pink & Moist





Neck


Neck Exam:  Neck Supple





Pulmonary


Resp Exam:  Clear Bilaterally, Breath Sounds Equal





Cardiology


CV Exam:  Regular, Normal Sinus Rhythm





Gastrointestinal/Abdomen


GI Exam:  Soft, Non-Tender, Bowel Sounds Present, Non-Distended





Musculoskeletal


MS Exam:  Joints Intact





Integumentary


Skin Exam:  Warm, Dry





Extremeties


Extremities Exam:  No Edema, Pedal Pulses Palpable





Neurologic


Neuro Exam:  Alert, Awake, Oriented, Speech Clear, Moving All Extremities, No 

Focal Deficits





Psychiatric


Psych Exam:  Appropriate Responses





VTE Prophylaxis


VTE Prophylaxis Device:  SCDs


Transfer Summary


70 years old and complains of retrosternal chest pain 7/10 which started 

shortly after he ate a roast beef dinner, about 90 minutes prior to the time of 

evaluation.  The pain was constant.  He denied radiation of the pain however 

noted that this morning he had some pain in the region of the right flank which 

seemed to radiate upward towards the right chest.  That pain went away as the 

patient worked today at the [employer].  He reports a family history of 

coronary artery disease with his father suffering an MI.  He has no prior 

history of coronary artery disease.  At 21:48 and EKG was obtained revealing an 

inferior wall acute myocardial infarction.  Cardiologist was notified and the 

patient went immediately to cardiac catheterization for successful percutaneous 

intervention.





SUBJ 6/16/17: Denies chest pain today.  Mild hypotension yesterday has now 

resolved.  Lisinopril placed on hold as the creatinine has increased to 2.1. UO 

2L in 24 hours


Hospital Course





These are the diagnoses that were used to treat this patient during this 

hospital stay.


(1) STEMI (ST elevation myocardial infarction)


(2) Systolic CHF


(3) Ischemic cardiomyopathy


(4) Acute kidney failure


(5) Hypotension


(6) Leukocytosis


(7) CAD (coronary artery disease)


(8) Hyperlipidemia


(9) HTN (hypertension)


(10) DM (diabetes mellitus)





Brief summary of history 


70-year-old male with history hypertension, diabetes, hyperlipidemia.  

Presented with chest pressure, was found positive for acute inferior wall MI.  

Patient underwent emergent cardiac catheterization per Dr. Thomas.  Patient 

had thrombectomy, angioplasty and stent of proximal, mid and distal RCA.  

Attempted but unsuccessful angioplasty of mid LAD.  He was found patient has 

severe left ventricular dysfunction.


Medical management will include  Effient, Imdur, Coreg


ACE inhibitor, low-dose started by cardiology and he will follow renal 

function as outpatient.  


-Echocardiogram showing EF of 35-40%.  Also pulmonary hypertension.  Patient 

was evaluated for a LifeVest and placed on before discharge





During this course of treatment patient's vital signs were closely monitored 

every 4 and when necessary.  The abnormals were treated and documented


Patient had multiple labs during this hospital course.  They were monitored and 

treated by multiple teams medical personnel.





Patient had problems with his Acute on chronic renal injury, does have 

underlying disease, did receive contrast with cardiac catheterization.


Nephrology evaluated patient and noted that  Patient has mild rhabdomyolysis 

and the he also underwent heart catheterization with dye combination of this 

could lead to kidney dysfunction however he is still passing urine.  He ordered 

to replace potassium and obtain a baseline kidney ultrasound


Also noted to Follow BMP


Avoid further dye studies or gadolinium or NSAID, which was followed during 

this hospital course.





Rhabdomyolysis was noted and monitored, gradual slow resolved but noted


-Labs reviewed daily


Renal ultrasound has been ordered, 6 mm stone mid pole right kidney 


Continue ACE inhibitor at this time, low-dose


Nephrology following patient, input and plan a care was appreciated


-creat with some improvement,likely now at baseline 


-labs reviewed  





Was noted the patient has been noncompliant with his Type 2 diabetes, and 

taking oral hypoglycemics


hemoglobin A1c was checked


Continue with Accu-Cheks before meals and at bedtime and insulin therapy





Hypertension, stable, BP


Continue home medications





Hyperlipidemia,


Continue home medications





History of bladder cancer or previous cystoscopy, has not had follow-up with 

urology


Prostate cancer, had biopsy and never was treated


-Encouraged patient to follow up with consultants as outpatient.





Depressed, now walking around. 


-emotional support provided, continue to monitor.





Continue with SCDs for DVT prophylaxis





Once patient was medically stable with no chest pain, cardiac management as 

well as his comorbidities, patient was evaluated for discharge home.


On day of discharge Dr. Lagos's outpatient and evaluated medical stability.  

Dr. Lagos called and


Discussed with Dr. Thomas about discharge planning.  He agreed with his 

discharge today


Meds reviewed


Follow-up explained to the patient.


Pt Condition on Discharge:  Good


Discharge Disposition:  Discharge Home


Discharge Instructions


DIET: Follow Instructions for:  Heart Healthy Diet, Diabetic Diet


Activities you can perform:  Weight Bearing as Colin


Follow up Referrals:  


Cardiology - 2 Weeks


Nephrology - 2 Weeks


PCP Follow-up - 1 Week





New Medications:  


Aspirin (Aspirin Low Strength) 81 Mg Chew


81 MG PO DAILY CAD #30 EA


Atorvastatin (Atorvastatin) 40 Mg Tab


80 MG PO HS hyperlipidemia #30 TAB


Carvedilol (Coreg) 3.125 Mg Tab


3.125 MG PO BID hypertension #62 TAB


Isosorbide Mononitrate ER (Isosorbide Mononitrate ER) 30 Mg Pato


30 MG PO DAILY CAD #30 TAB


Lisinopril (Lisinopril) 5 Mg Tab


2.5 MG PO DAILY CAD #31 TAB


Prasugrel (Effient) 10 Mg Tab


10 MG PO DAILY CAD #31 TAB


 


Continued Medications:  


Lorazepam (Ativan) 1 Mg Tab


0.5 - 1 TAB PO Q8HPRN #20 Ref 0


Metformin XR 24 HR (Glucophage XR 24 HR) 500 Mg Tab


500 MG PO BID Ref 0


Sertraline Hcl (Zoloft) 100 Mg Tab


100 MG PO HS Ref 0


 


Discontinued Medications:  


Clonidine 0.2 mg (Catapres 0.2 mg) 0.2 Mg Tab


0.2 MG PO DAILY #28 Ref 0


Enalapril Maleate (Vasotec) 10 Mg Tab


10 MG PO BID Ref 0


Hydrochlorothiazide (Hctz) 25 Mg Tab


25 MG PO DAILY Ref 0


Simvastatin (Zocor 40 mg) 40 Mg Tab


40 MG PO HS Ref 0


Toprol XL (Toprol XL) 100 Mg Tab


100 MG PO DAILY Ref 0











Aster Gale Jun 20, 2017 16:17

## 2022-10-31 NOTE — HHI.NPPN
Chief Complaint   Patient presents with     URI       Initial /70 (BP Location: Right arm, Patient Position: Sitting, Cuff Size: Adult Regular)   Pulse 98   Temp 98.6  F (37  C) (Tympanic)   Resp 16   Ht 1.524 m (5')   Wt 70.6 kg (155 lb 11.2 oz)   SpO2 98%   BMI 30.41 kg/m   Estimated body mass index is 30.41 kg/m  as calculated from the following:    Height as of this encounter: 1.524 m (5').    Weight as of this encounter: 70.6 kg (155 lb 11.2 oz).  Medication Reconciliation: complete  Beatriz Cormier LPN   Subjective


Interval History


Non oliguric. Transferred out of ICU. Renal function has improved.





Review of Systems


General


Constitutional:  Fatigue





Objective Data


Data











 6/16/17 6/17/17





 19:00 07:00


 


Intake Total 500 ml 480 ml


 


Output Total 350 ml 0 ml


 


Balance 150 ml 480 ml


 


  


 


Intake Oral 500 ml 480 ml


 


IV Total  0 ml


 


Output Urine Total 350 ml 


 


Emesis  0 ml


 


# Voids  3


 


# Bowel Movements 0 0











 Vital Signs








  Date Time  Temp Pulse Resp B/P Pulse Ox O2 Delivery O2 Flow Rate FiO2


 


6/17/17 06:00  96      


 


6/17/17 05:00  88      


 


6/17/17 04:00  78      


 


6/17/17 03:21 97.4 87 24 121/75 94   


 


6/17/17 03:00  78      


 


6/17/17 02:00  74      


 


6/17/17 01:00  78      


 


6/17/17 00:08 99.2 84 20 108/67 100   


 


6/17/17 00:00  80      


 


6/16/17 23:00  82      


 


6/16/17 22:00  82      


 


6/16/17 21:00  86      


 


6/16/17 20:00  98      


 


6/16/17 19:44     97 Nasal Cannula 2.00 


 


6/16/17 19:44 99.6 97 20 114/68 97   


 


6/16/17 19:00  87      


 


6/16/17 18:00  90      


 


6/16/17 17:00  90      


 


6/16/17 16:45 98.5 88 18 110/73 98   


 


6/16/17 14:00  90      


 


6/16/17 12:00  94      


 


6/16/17 12:00 97.9 94 16 128/71 96   


 


6/16/17 10:00  88      








-:  


6/17/17 0453                                                                   

             6/17/17 0453








Physical Exam


General


Appearance:  Well Developed, No Acute Distress





Neck


Neck Exam:  Neck Supple





Pulmonary


Resp Exam:  Clear Bilaterally, Breath Sounds Equal





Cardiology


CV Exam:  Regular, Normal Sinus Rhythm





Gastrointestinal/Abdomen


GI Exam:  Soft, Non-Tender, Bowel Sounds Present





Musculoskeletal


MS Exam:  Joints Intact





Neurologic


Neuro Exam:  Alert, Moving All Extremities





Assessment/Plan


Problem List:  


(1) Acute kidney failure


Plan:  Renal function is improving. 


Non oliguric. 


Avoid nephrotoxic agents. 





(2) STEMI (ST elevation myocardial infarction)


Plan:  Status post LAD stent





(3) DM (diabetes mellitus)


Plan:  Continue to monitor blood glucose





(4) HTN (hypertension)


Plan:  BP is low normal. Once renal function stabilizes, low dose ACE inhibitor 

can be utilized. 





(5) Bladder cancer


Plan:  Follows with urology





(6) Prostate cancer


Plan:  He was taking finasteride








Problem Qualifiers





(1) Acute kidney failure:  


Qualified Code:  N17.9 - Acute renal failure, unspecified acute renal failure 

type


(2) STEMI (ST elevation myocardial infarction):  


Qualified Code:  I21.19 - ST elevation myocardial infarction (STEMI) involving 

other coronary artery of inferior wall


(3) DM (diabetes mellitus):  


Qualified Code:  E11.59 - Type 2 diabetes mellitus with other circulatory 

complication, without long-term current use of insulin


(4) HTN (hypertension):  


Qualified Code:  I10 - Essential hypertension


(5) Bladder cancer:  


Qualified Code:  C67.9 - Malignant neoplasm of urinary bladder, unspecified site





Dieter Martínez MD Jun 17, 2017 08:54